# Patient Record
Sex: FEMALE | Race: WHITE | NOT HISPANIC OR LATINO | Employment: FULL TIME | URBAN - METROPOLITAN AREA
[De-identification: names, ages, dates, MRNs, and addresses within clinical notes are randomized per-mention and may not be internally consistent; named-entity substitution may affect disease eponyms.]

---

## 2024-01-04 ENCOUNTER — LAB (OUTPATIENT)
Dept: LAB | Facility: HOSPITAL | Age: 24
End: 2024-01-04
Payer: COMMERCIAL

## 2024-01-04 ENCOUNTER — TRANSCRIBE ORDERS (OUTPATIENT)
Dept: ADMINISTRATIVE | Facility: HOSPITAL | Age: 24
End: 2024-01-04
Payer: COMMERCIAL

## 2024-01-04 DIAGNOSIS — O02.1 MISSED ABORTION: Primary | ICD-10-CM

## 2024-01-04 DIAGNOSIS — O02.1 MISSED ABORTION: ICD-10-CM

## 2024-01-04 LAB — HCG INTACT+B SERPL-ACNC: NORMAL MIU/ML

## 2024-01-04 PROCEDURE — 84702 CHORIONIC GONADOTROPIN TEST: CPT

## 2024-01-04 PROCEDURE — 36415 COLL VENOUS BLD VENIPUNCTURE: CPT

## 2024-01-08 ENCOUNTER — APPOINTMENT (OUTPATIENT)
Dept: ULTRASOUND IMAGING | Facility: HOSPITAL | Age: 24
End: 2024-01-08
Payer: COMMERCIAL

## 2024-01-08 ENCOUNTER — HOSPITAL ENCOUNTER (EMERGENCY)
Facility: HOSPITAL | Age: 24
Discharge: HOME | End: 2024-01-08
Attending: EMERGENCY MEDICINE | Admitting: EMERGENCY MEDICINE
Payer: COMMERCIAL

## 2024-01-08 VITALS
TEMPERATURE: 98 F | BODY MASS INDEX: 40.4 KG/M2 | RESPIRATION RATE: 18 BRPM | WEIGHT: 266.54 LBS | HEIGHT: 68 IN | DIASTOLIC BLOOD PRESSURE: 71 MMHG | HEART RATE: 90 BPM | OXYGEN SATURATION: 98 % | SYSTOLIC BLOOD PRESSURE: 118 MMHG

## 2024-01-08 DIAGNOSIS — N93.9 VAGINAL BLEEDING: ICD-10-CM

## 2024-01-08 DIAGNOSIS — O02.1 MISSED ABORTION: Primary | ICD-10-CM

## 2024-01-08 LAB
ABO GROUP BLD: NORMAL
ANION GAP SERPL CALCULATED.3IONS-SCNC: 12 MMOL/L (ref 5–15)
BASOPHILS # BLD AUTO: 0 10*3/MM3 (ref 0–0.2)
BASOPHILS NFR BLD AUTO: 0.3 % (ref 0–1.5)
BLD GP AB SCN SERPL QL: NEGATIVE
BUN SERPL-MCNC: 9 MG/DL (ref 6–20)
BUN/CREAT SERPL: 17.6 (ref 7–25)
CALCIUM SPEC-SCNC: 9.4 MG/DL (ref 8.6–10.5)
CHLORIDE SERPL-SCNC: 104 MMOL/L (ref 98–107)
CO2 SERPL-SCNC: 24 MMOL/L (ref 22–29)
CREAT SERPL-MCNC: 0.51 MG/DL (ref 0.57–1)
DEPRECATED RDW RBC AUTO: 49 FL (ref 37–54)
EGFRCR SERPLBLD CKD-EPI 2021: 134.7 ML/MIN/1.73
EOSINOPHIL # BLD AUTO: 0.1 10*3/MM3 (ref 0–0.4)
EOSINOPHIL NFR BLD AUTO: 1.2 % (ref 0.3–6.2)
ERYTHROCYTE [DISTWIDTH] IN BLOOD BY AUTOMATED COUNT: 14.9 % (ref 12.3–15.4)
GLUCOSE SERPL-MCNC: 84 MG/DL (ref 65–99)
HCG INTACT+B SERPL-ACNC: 2963 MIU/ML
HCT VFR BLD AUTO: 39.7 % (ref 34–46.6)
HGB BLD-MCNC: 13.3 G/DL (ref 12–15.9)
HOLD SPECIMEN: NORMAL
HOLD SPECIMEN: NORMAL
LYMPHOCYTES # BLD AUTO: 2.1 10*3/MM3 (ref 0.7–3.1)
LYMPHOCYTES NFR BLD AUTO: 18.9 % (ref 19.6–45.3)
MCH RBC QN AUTO: 29.9 PG (ref 26.6–33)
MCHC RBC AUTO-ENTMCNC: 33.5 G/DL (ref 31.5–35.7)
MCV RBC AUTO: 89.1 FL (ref 79–97)
MONOCYTES # BLD AUTO: 0.6 10*3/MM3 (ref 0.1–0.9)
MONOCYTES NFR BLD AUTO: 5.2 % (ref 5–12)
NEUTROPHILS NFR BLD AUTO: 74.4 % (ref 42.7–76)
NEUTROPHILS NFR BLD AUTO: 8.2 10*3/MM3 (ref 1.7–7)
NRBC BLD AUTO-RTO: 0 /100 WBC (ref 0–0.2)
PLATELET # BLD AUTO: 285 10*3/MM3 (ref 140–450)
PMV BLD AUTO: 8.4 FL (ref 6–12)
POTASSIUM SERPL-SCNC: 4 MMOL/L (ref 3.5–5.2)
RBC # BLD AUTO: 4.45 10*6/MM3 (ref 3.77–5.28)
RH BLD: POSITIVE
SODIUM SERPL-SCNC: 140 MMOL/L (ref 136–145)
T&S EXPIRATION DATE: NORMAL
WBC NRBC COR # BLD AUTO: 11 10*3/MM3 (ref 3.4–10.8)
WHOLE BLOOD HOLD COAG: NORMAL
WHOLE BLOOD HOLD SPECIMEN: NORMAL

## 2024-01-08 PROCEDURE — 84702 CHORIONIC GONADOTROPIN TEST: CPT

## 2024-01-08 PROCEDURE — 93976 VASCULAR STUDY: CPT

## 2024-01-08 PROCEDURE — 85025 COMPLETE CBC W/AUTO DIFF WBC: CPT

## 2024-01-08 PROCEDURE — 86900 BLOOD TYPING SEROLOGIC ABO: CPT

## 2024-01-08 PROCEDURE — 86901 BLOOD TYPING SEROLOGIC RH(D): CPT

## 2024-01-08 PROCEDURE — 86900 BLOOD TYPING SEROLOGIC ABO: CPT | Performed by: EMERGENCY MEDICINE

## 2024-01-08 PROCEDURE — 86850 RBC ANTIBODY SCREEN: CPT | Performed by: EMERGENCY MEDICINE

## 2024-01-08 PROCEDURE — 80048 BASIC METABOLIC PNL TOTAL CA: CPT

## 2024-01-08 PROCEDURE — 86901 BLOOD TYPING SEROLOGIC RH(D): CPT | Performed by: EMERGENCY MEDICINE

## 2024-01-08 PROCEDURE — 25810000003 SODIUM CHLORIDE 0.9 % SOLUTION

## 2024-01-08 PROCEDURE — 76830 TRANSVAGINAL US NON-OB: CPT

## 2024-01-08 PROCEDURE — 76856 US EXAM PELVIC COMPLETE: CPT

## 2024-01-08 PROCEDURE — 99284 EMERGENCY DEPT VISIT MOD MDM: CPT

## 2024-01-08 RX ORDER — MISOPROSTOL 200 UG/1
800 TABLET ORAL ONCE
Qty: 4 TABLET | Refills: 0 | Status: SHIPPED | OUTPATIENT
Start: 2024-01-08 | End: 2024-01-08

## 2024-01-08 RX ORDER — SODIUM CHLORIDE 0.9 % (FLUSH) 0.9 %
10 SYRINGE (ML) INJECTION AS NEEDED
Status: DISCONTINUED | OUTPATIENT
Start: 2024-01-08 | End: 2024-01-08 | Stop reason: HOSPADM

## 2024-01-08 RX ORDER — ONDANSETRON 4 MG/1
4 TABLET, ORALLY DISINTEGRATING ORAL EVERY 6 HOURS PRN
Qty: 8 TABLET | Refills: 0 | Status: SHIPPED | OUTPATIENT
Start: 2024-01-08

## 2024-01-08 RX ADMIN — SODIUM CHLORIDE 1000 ML: 9 INJECTION, SOLUTION INTRAVENOUS at 12:56

## 2024-01-08 NOTE — DISCHARGE INSTRUCTIONS
Take medication as directed by Dr. Le    Follow-up as scheduled    Rest, increase fluids    Follow-up with PCP as needed    Return to the ER for new or worsening symptoms

## 2024-01-08 NOTE — ED NOTES
Pt c/o vaginal bleeding that started on 1/3/24.  Pt reports passing tissue on the 4th.  Pt took medication on 1/3/24 to pass products of conception d/t miscarriage.

## 2024-01-08 NOTE — ED PROVIDER NOTES
"Subjective   History of Present Illness  Chief Complaint: Vaginal bleeding      HPI: Patient is a 23-year-old female who presents by private vehicle with spouse at bedside reports she had a confirmed pregnancy on January 3 she began having vaginal bleeding she was approximately 11 weeks 2 days ultrasound was obtained noted baby only measuring 10 weeks 5 days she is a G2, .  She completed the \"vaginal pill, 2 move along delivery of the baby on  she states that she passed the baby and went to her OB/GYN.  She has since had an increase in bleeding with large clots approximately the size of small oranges.  She has had no dizziness, attempted treatment with over-the-counter Tylenol Extra Strength as well as Advil Tylenol combination states the cramping has improved but continues to be intermittent.  She has had no nausea or vomiting no dysuria or frequency.    PCP: Fred  GYN: Rey    History provided by:  Patient      Review of Systems   Gastrointestinal:  Positive for abdominal pain. Negative for constipation, diarrhea and nausea.   Genitourinary:  Positive for vaginal bleeding.       No past medical history on file.    No Known Allergies    No past surgical history on file.    No family history on file.    Social History     Socioeconomic History    Marital status:            Objective   Physical Exam  Vitals reviewed.   Constitutional:       Appearance: She is obese.   HENT:      Head: Normocephalic.   Eyes:      Extraocular Movements: Extraocular movements intact.      Pupils: Pupils are equal, round, and reactive to light.   Cardiovascular:      Rate and Rhythm: Normal rate.      Pulses: Normal pulses.   Pulmonary:      Effort: Pulmonary effort is normal.      Breath sounds: Normal breath sounds.   Musculoskeletal:      Cervical back: Normal range of motion.   Skin:     General: Skin is warm and dry.      Capillary Refill: Capillary refill takes less than 2 seconds.   Neurological:      " "General: No focal deficit present.      Mental Status: She is alert and oriented to person, place, and time. Mental status is at baseline.      Motor: No weakness.         Procedures  She was placed in the lithotomy position and external genitalia were found to have no lesions and no swelling.  Speculum exam shows open cervix and moderate blood in the vaginal vault, note tissue at the cervical os.  The patient had no cervical motion tenderness-the cervix.  Patient had no adnexal tenderness.  The patient had cultures obtained and the exam was performed with Yenifer marroquin at bedside         ED Course  ED Course as of 01/08/24 1730   Mon Jan 08, 2024   1242 Patient was on room air during my evaluation.  No complaints of chest pain or shortness of breath. []   1449 Returned call from OB. []   1549 Awaiting return call from OB   []   1600 Poke with Dr. Le with OB/GYN reviewing the patient's presentation and ED findings.  Patient be kept n.p.o. and plan for possible transfer to OR for D&C.  Patient has been updated. []   1602 I have spoke to the patient at bedside regarding the plan of care she did just tell me that she has eaten oreos in the last hour.  []   1605 Dr. Le to bedside to discuss plan of care.  []      ED Course User Index  [] Ruth Keys, APRN      /71   Pulse 90   Temp 98 °F (36.7 °C)   Resp 18   Ht 172.7 cm (68\")   Wt 121 kg (266 lb 8.6 oz)   LMP 10/15/2023 (Exact Date)   SpO2 98%   BMI 40.53 kg/m²   Labs Reviewed   BASIC METABOLIC PANEL - Abnormal; Notable for the following components:       Result Value    Creatinine 0.51 (*)     All other components within normal limits    Narrative:     GFR Normal >60  Chronic Kidney Disease <60  Kidney Failure <15     CBC WITH AUTO DIFFERENTIAL - Abnormal; Notable for the following components:    WBC 11.00 (*)     Lymphocyte % 18.9 (*)     Neutrophils, Absolute 8.20 (*)     All other components within normal limits "   RAINBOW DRAW    Narrative:     The following orders were created for panel order [place] Draw.  Procedure                               Abnormality         Status                     ---------                               -----------         ------                     Green Top (Gel)[[id number]]                                  Final result               Lavender Top[[id number]]                                     Final result               Gold Top - SST[[id number]]                                   Final result               Light Blue Top[[id number]]                                   Final result                 Please view results for these tests on the individual orders.   HCG, QUANTITATIVE, PREGNANCY    Narrative:     HCG Ranges by Gestational Age    Females - non-pregnant premenopausal   </= 1mIU/mL HCG  Females - postmenopausal               </= 7mIU/mL HCG    3 Weeks       5.4   -      72 mIU/mL  4 Weeks      10.2   -     708 mIU/mL  5 Weeks       217   -   8,245 mIU/mL  6 Weeks       152   -  32,177 mIU/mL  7 Weeks     4,059   - 153,767 mIU/mL  8 Weeks    31,366   - 149,094 mIU/mL  9 Weeks    59,109   - 135,901 mIU/mL  10 Weeks   44,186   - 170,409 mIU/mL  12 Weeks   27,107   - 201,615 mIU/mL  14 Weeks   24,302   -  93,646 mIU/mL  15 Weeks   12,540   -  69,747 mIU/mL  16 Weeks    8,904   -  55,332 mIU/mL  17 Weeks    8,240   -  51,793 mIU/mL  18 Weeks    9,649   -  55,271 mIU/mL     TYPE AND SCREEN   GREEN TOP   LAVENDER TOP   GOLD TOP - SST   LIGHT BLUE TOP   CBC AND DIFFERENTIAL    Narrative:     The following orders were created for panel order CBC & Differential.  Procedure                               Abnormality         Status                     ---------                               -----------         ------                     CBC Auto Differential[[id number]]        Abnormal            Final result                 Please view results for these tests on the individual orders.     Medications    sodium chloride 0.9 % flush 10 mL (has no administration in time range)   sodium chloride 0.9 % flush 10 mL (has no administration in time range)   sodium chloride 0.9 % bolus 1,000 mL (0 mL Intravenous Stopped 1/8/24 1326)     US Pelvis Complete    Result Date: 1/8/2024  Impression: 1. Heterogeneous endometrial thickening up to 20 mm. No focal endometrial lesion is identified. The findings could represent residual endometrial thickening/hyperplasia related to postpartum state. Given persistence of abnormal vaginal bleeding, retained  products of conception cannot be excluded. 2. Normal sonographic appearance of the ovaries. 3. No pelvic free fluid. Electronically Signed: Lillie Alonzo MD  1/8/2024 2:23 PM EST  Workstation ID: [id number]    US Non-ob Transvaginal    Result Date: 1/8/2024  Impression: 1. Heterogeneous endometrial thickening up to 20 mm. No focal endometrial lesion is identified. The findings could represent residual endometrial thickening/hyperplasia related to postpartum state. Given persistence of abnormal vaginal bleeding, retained  products of conception cannot be excluded. 2. Normal sonographic appearance of the ovaries. 3. No pelvic free fluid. Electronically Signed: Lillie Alonzo MD  1/8/2024 2:23 PM EST  Workstation ID: [id number]                                          Medical Decision Making  Patient presented with above complaints an IV was established and labs were obtained CBC notes a hemoglobin of 13.3 white blood cell count slightly elevated 11, BMP notes no acute renal insufficiency.  hCG quant greater than 2000 this is significantly decreased from approximately 4 days ago at 22,000.  Ultrasound was obtained notes endometrial thickening cannot exclude retained products of conception.  Spoke with Dr. Le as we discussed patient is requesting a dilatation and curettage.  Tyler had discussed getting the patient in for procedure today unfortunately OR is unable to get in with her  today, Dr. Le came to bedside for additional evaluation advises that she will see the patient outpatient in 2 days and has sent in a prescription for buccal Cytotec.  At bedside I did discuss the current plan of care with the patient.  He is agreeable to this plan of care.  She denies further questions or complaints at time of discharge.  She has been hemodynamically stable throughout her emergency room stay.    Chart review: 2022 outpatient visit with audiology related to a traumatic temporal bone fracture.      Note Disclaimer: At [facility], we believe that sharing information builds trust and better  relationships. You are receiving this note because you recently visited [facility]. It is possible you will see health information before a provider has talked with you about it. This kind of information can be easy to misunderstand. To help you fully understand what it means for your health, we urge you to discuss this note with your provider.       Part of this note may be an electronic transcription/translation of spoken language to printed text using the Dragon Dictation System.    Appropriate PPE worn during exam.    Problems Addressed:  Missed : complicated acute illness or injury  Vaginal bleeding: complicated acute illness or injury    Amount and/or Complexity of Data Reviewed  External Data Reviewed: notes.  Labs: ordered. Decision-making details documented in ED Course.  Radiology: ordered and independent interpretation performed. Decision-making details documented in ED Course.    Risk  Prescription drug management.        Final diagnoses:   Missed    Vaginal bleeding       ED Disposition  ED Disposition       ED Disposition   Discharge    Condition   Stable    Comment   --               Anne-Marie Ibarra, APRN  [address] [facility] [address] [phone]          [facility] [place] [id number] [address] [address] [phone]  Call  today           Medication List        New Prescriptions      miSOPROStol 200 MCG tablet  Commonly known as: Cytotec  Take 4 tablets by mouth 1 (One) Time for 1 dose. Place in mouth, let medication dissolve. Place two tablets on each side of the cheek to allow medication to dissolve.     ondansetron ODT 4 MG disintegrating tablet  Commonly known as: ZOFRAN-ODT  Place 1 tablet on the tongue Every 6 (Six) Hours As Needed for Nausea or Vomiting.               Where to Get Your Medications        These medications were sent to [facility] - [facility] [place] - [address] - [phone] [place] [phone]   [address] [facility] [place] [address]      Phone: [phone]   miSOPROStol 200 MCG tablet  ondansetron ODT 4 MG disintegrating tablet            Ruth Keys, APRN  01/08/24 0979

## 2024-01-08 NOTE — CONSULTS
Referring Provider: NICHOLAS Guillen   Reason for Consultation: Missed , cannot rule out rpoc    Patient Care Team:  Anne-Marie Ibarra APRN as PCP - General (Nurse Practitioner)    Chief complaint prior missed  with vaginal bleeding    Subjective .     History of present illness:  24 y/o  presents to ED with heavy vaginal bleeding. She has known missed  diagnosed on US in the office last week. She opted for medical management where she took 2 doses of cytotec, 400mcg vaginally each time. Following cytotec, she does report heavy bleeding and passage of products. She came in today because she had increase in bleeding and passage of clots. She was concerned about amount of bleeding. At this time, she reports bleeding has improved, no pain, lightheadedness or dizziness. US showed no CRL, but 2cm thickened EMS and cannot rule out RPOC. Patient recently ate cookies for a snack after US while in the ED. She did have prior D&C for first miscarriage.       * No active hospital problems. *      No past medical history on file.    No past surgical history on file.    No obstetric history on file.    No Known Allergies      Objective     Vital Signs   Vitals:    24 1529 24 1534 24 1539 24 1544   BP: 118/71      BP Location:       Patient Position:       Pulse: 82 86 90 90   Resp:       Temp:       TempSrc:       SpO2: 99% 98% 98% 98%   Weight:       Height:         Temp (24hrs), Av.2 °F (36.8 °C), Min:98.2 °F (36.8 °C), Max:98.2 °F (36.8 °C)      Physical Exam:     General Appearance:    Alert, cooperative, in no acute distress   Abdomen:     Normal bowel sounds, no masses, no organomegaly, soft        non-tender, non-distended, no guarding, no rebound                 tenderness   Genitalia:    Per APRN, had minimal bleeding, with no products noted- see note for more information      Lab Results:  Lab Results (last 48 hours)       Procedure Component Value Units  Date/Time    [place] Draw [[id number]] Collected: 01/08/24 1219    Specimen: Blood Updated: 01/08/24 1330    Narrative:      The following orders were created for panel order [place] Draw.  Procedure                               Abnormality         Status                     ---------                               -----------         ------                     Green Top (Gel)[[id number]]                                  Final result               Lavender Top[[id number]]                                     Final result               Gold Top - SST[[id number]]                                   Final result               Light Blue Top[[id number]]                                   Final result                 Please view results for these tests on the individual orders.    Green Top (Gel) [[id number]] Collected: 01/08/24 1219    Specimen: Blood from Arm, Left Updated: 01/08/24 1330     Extra Tube Hold for add-ons.     Comment: Auto resulted.       Lavender Top [[id number]] Collected: 01/08/24 1219    Specimen: Blood from Arm, Left Updated: 01/08/24 1330     Extra Tube hold for add-on     Comment: Auto resulted       Gold Top - SST [[id number]] Collected: 01/08/24 1219    Specimen: Blood from Arm, Left Updated: 01/08/24 1330     Extra Tube Hold for add-ons.     Comment: Auto resulted.       Light Blue Top [[id number]] Collected: 01/08/24 1219    Specimen: Blood Updated: 01/08/24 1330     Extra Tube Hold for add-ons.     Comment: Auto resulted       CBC & Differential [[id number]]  (Abnormal) Collected: 01/08/24 1219    Specimen: Blood from Arm, Left Updated: 01/08/24 1318    Narrative:      The following orders were created for panel order CBC & Differential.  Procedure                               Abnormality         Status                     ---------                               -----------         ------                     CBC Auto Differential[[id number]]        Abnormal            Final result                 Please  view results for these tests on the individual orders.    CBC Auto Differential [[id number]]  (Abnormal) Collected: 01/08/24 1219    Specimen: Blood from Arm, Left Updated: 01/08/24 1318     WBC 11.00 10*3/mm3      RBC 4.45 10*6/mm3      Hemoglobin 13.3 g/dL      Hematocrit 39.7 %      MCV 89.1 fL      MCH 29.9 pg      MCHC 33.5 g/dL      RDW 14.9 %      RDW-SD 49.0 fl      MPV 8.4 fL      Platelets 285 10*3/mm3      Neutrophil % 74.4 %      Lymphocyte % 18.9 %      Monocyte % 5.2 %      Eosinophil % 1.2 %      Basophil % 0.3 %      Neutrophils, Absolute 8.20 10*3/mm3      Lymphocytes, Absolute 2.10 10*3/mm3      Monocytes, Absolute 0.60 10*3/mm3      Eosinophils, Absolute 0.10 10*3/mm3      Basophils, Absolute 0.00 10*3/mm3      nRBC 0.0 /100 WBC     hCG, Quantitative, Pregnancy [[id number]] Collected: 01/08/24 1219    Specimen: Blood from Arm, Left Updated: 01/08/24 1254     HCG Quantitative 2,963.00 mIU/mL     Narrative:      HCG Ranges by Gestational Age    Females - non-pregnant premenopausal   </= 1mIU/mL HCG  Females - postmenopausal               </= 7mIU/mL HCG    3 Weeks       5.4   -      72 mIU/mL  4 Weeks      10.2   -     708 mIU/mL  5 Weeks       217   -   8,245 mIU/mL  6 Weeks       152   -  32,177 mIU/mL  7 Weeks     4,059   - 153,767 mIU/mL  8 Weeks    31,366   - 149,094 mIU/mL  9 Weeks    59,109   - 135,901 mIU/mL  10 Weeks   44,186   - 170,409 mIU/mL  12 Weeks   27,107   - 201,615 mIU/mL  14 Weeks   24,302   -  93,646 mIU/mL  15 Weeks   12,540   -  69,747 mIU/mL  16 Weeks    8,904   -  55,332 mIU/mL  17 Weeks    8,240   -  51,793 mIU/mL  18 Weeks    9,649   -  55,271 mIU/mL      Basic Metabolic Panel [[id number]]  (Abnormal) Collected: 01/08/24 1219    Specimen: Blood from Arm, Left Updated: 01/08/24 1252     Glucose 84 mg/dL      BUN 9 mg/dL      Creatinine 0.51 mg/dL      Sodium 140 mmol/L      Potassium 4.0 mmol/L      Chloride 104 mmol/L      CO2 24.0 mmol/L      Calcium 9.4 mg/dL       BUN/Creatinine Ratio 17.6     Anion Gap 12.0 mmol/L      eGFR 134.7 mL/min/1.73     Narrative:      GFR Normal >60  Chronic Kidney Disease <60  Kidney Failure <15              Radiology Results:  Imaging Results (Last 72 Hours)       Procedure Component Value Units Date/Time    US Pelvis Complete [[id number]] Collected: 24 1419     Updated: 24 1425    Narrative:      US PELVIS COMPLETE, US NON-OB TRANSVAGINAL    Date of Exam: 2024 2:09 PM EST    Indication: Spontaneous  2024. Increased vaginal bleeding..    Comparison: No comparisons available.    Technique: Transabdominal and transvaginal ultrasound evaluation of the pelvis was performed utilizing grayscale and color Doppler technique.  Doppler spectral analysis was performed.      Findings:  The uterus measures 11.2 x 6.4 x 6.2 cm. The myometrium is homogeneous without focal abnormality. Heterogeneous endometrial canal thickening up to 2 cm. No focal no well-defined endometrial lesion is identified. No endometrial soft tissue   hypervascularity is evident. Trace fluid is suggested within the endometrial canal (image 49).    No gross pelvic free fluid is evident.    The right ovary measures 2.9 x 3.6 x 1.9 cm. Left ovary measures 3.2 x 3.4 x 2.1 submeters. The ovaries demonstrate no cystic or solid abnormality. The ovaries demonstrate normal color flow. Ovaries are seen on transabdominal imaging only.      Impression:      Impression:    1. Heterogeneous endometrial thickening up to 20 mm. No focal endometrial lesion is identified. The findings could represent residual endometrial thickening/hyperplasia related to postpartum state. Given persistence of abnormal vaginal bleeding, retained   products of conception cannot be excluded.  2. Normal sonographic appearance of the ovaries.  3. No pelvic free fluid.    Electronically Signed: Lillie Alonzo MD    2024 2:23 PM EST    Workstation ID: [id number]    US Non-ob Transvaginal [[id number]]  Collected: 24 1419     Updated: 24 1425    Narrative:      US PELVIS COMPLETE, US NON-OB TRANSVAGINAL    Date of Exam: 2024 2:09 PM EST    Indication: Spontaneous  2024. Increased vaginal bleeding..    Comparison: No comparisons available.    Technique: Transabdominal and transvaginal ultrasound evaluation of the pelvis was performed utilizing grayscale and color Doppler technique.  Doppler spectral analysis was performed.      Findings:  The uterus measures 11.2 x 6.4 x 6.2 cm. The myometrium is homogeneous without focal abnormality. Heterogeneous endometrial canal thickening up to 2 cm. No focal no well-defined endometrial lesion is identified. No endometrial soft tissue   hypervascularity is evident. Trace fluid is suggested within the endometrial canal (image 49).    No gross pelvic free fluid is evident.    The right ovary measures 2.9 x 3.6 x 1.9 cm. Left ovary measures 3.2 x 3.4 x 2.1 submeters. The ovaries demonstrate no cystic or solid abnormality. The ovaries demonstrate normal color flow. Ovaries are seen on transabdominal imaging only.      Impression:      Impression:    1. Heterogeneous endometrial thickening up to 20 mm. No focal endometrial lesion is identified. The findings could represent residual endometrial thickening/hyperplasia related to postpartum state. Given persistence of abnormal vaginal bleeding, retained   products of conception cannot be excluded.  2. Normal sonographic appearance of the ovaries.  3. No pelvic free fluid.    Electronically Signed: Lillie Alonzo MD    2024 2:23 PM EST    Workstation ID: [id number]            Assessment & Plan       * No active hospital problems. *      22 y/o  with prior missed  with medical management, presents to ED due to heavy vaginal bleeding.     Patient hemodynamically stable, normal VSS.   Hb 13 and platelets 285k  Patient counseled on suction D&C to remove possible clot or POC vs repeat dose of  800mcg of cytotec, since she only took the complete dose one time.   Patient counseled on risks of surgery: bleeding, infection, uterine perforation, and risk of uterine scarring.   Patient counseled on risks of medical management and may end up needing surgery in future.   She just ate oreos so surgery not emergent, will not perform in 8 hours due to recent eating.   Patient reports she opts for repeat dose of 800mcg of cytotec, will take it buccally.   Patient counseled on the TIERA of cytotec therapy, will send zofran as needed for nausea and vomiting.   She has a FU apt with me in the office on 1/11/24 and will assess if additional management indicated.   Patient given strict bleeding and return precautions.   Offered patient to have medication placed vaginally while in ED prior to discharge, patient declined and wants to take medication buccally at home.   Rh positive, rhogam not indicated.     I discussed the patients findings and my recommendations with patient     Jessenia Le MD   1/8/2024   16:03 EST

## 2024-01-11 ENCOUNTER — HOSPITAL ENCOUNTER (OUTPATIENT)
Facility: HOSPITAL | Age: 24
Discharge: HOME | End: 2024-01-11
Attending: STUDENT IN AN ORGANIZED HEALTH CARE EDUCATION/TRAINING PROGRAM | Admitting: STUDENT IN AN ORGANIZED HEALTH CARE EDUCATION/TRAINING PROGRAM
Payer: COMMERCIAL

## 2024-01-11 ENCOUNTER — ANESTHESIA EVENT (OUTPATIENT)
Dept: PERIOP | Facility: HOSPITAL | Age: 24
End: 2024-01-11
Payer: COMMERCIAL

## 2024-01-11 ENCOUNTER — ANESTHESIA (OUTPATIENT)
Dept: PERIOP | Facility: HOSPITAL | Age: 24
End: 2024-01-11
Payer: COMMERCIAL

## 2024-01-11 ENCOUNTER — LAB (OUTPATIENT)
Dept: LAB | Facility: HOSPITAL | Age: 24
End: 2024-01-11
Payer: COMMERCIAL

## 2024-01-11 ENCOUNTER — TRANSCRIBE ORDERS (OUTPATIENT)
Dept: LAB | Facility: HOSPITAL | Age: 24
End: 2024-01-11
Payer: COMMERCIAL

## 2024-01-11 VITALS
TEMPERATURE: 97.8 F | SYSTOLIC BLOOD PRESSURE: 123 MMHG | DIASTOLIC BLOOD PRESSURE: 73 MMHG | HEART RATE: 79 BPM | OXYGEN SATURATION: 96 % | RESPIRATION RATE: 17 BRPM

## 2024-01-11 DIAGNOSIS — Z01.818 PRE-OP TESTING: ICD-10-CM

## 2024-01-11 DIAGNOSIS — Z01.818 PRE-OP TESTING: Primary | ICD-10-CM

## 2024-01-11 PROBLEM — N96 RECURRENT PREGNANCY LOSS: Status: ACTIVE | Noted: 2024-01-11

## 2024-01-11 PROBLEM — O02.1 MISSED ABORTION: Status: ACTIVE | Noted: 2024-01-11

## 2024-01-11 LAB
ABO GROUP BLD: NORMAL
BASOPHILS # BLD AUTO: 0.1 10*3/MM3 (ref 0–0.2)
BASOPHILS NFR BLD AUTO: 0.7 % (ref 0–1.5)
BLD GP AB SCN SERPL QL: NEGATIVE
DEPRECATED RDW RBC AUTO: 48.6 FL (ref 37–54)
EOSINOPHIL # BLD AUTO: 0.1 10*3/MM3 (ref 0–0.4)
EOSINOPHIL NFR BLD AUTO: 1.4 % (ref 0.3–6.2)
ERYTHROCYTE [DISTWIDTH] IN BLOOD BY AUTOMATED COUNT: 14.9 % (ref 12.3–15.4)
HCT VFR BLD AUTO: 37.1 % (ref 34–46.6)
HGB BLD-MCNC: 12.4 G/DL (ref 12–15.9)
LYMPHOCYTES # BLD AUTO: 2.2 10*3/MM3 (ref 0.7–3.1)
LYMPHOCYTES NFR BLD AUTO: 25.2 % (ref 19.6–45.3)
MCH RBC QN AUTO: 29.7 PG (ref 26.6–33)
MCHC RBC AUTO-ENTMCNC: 33.4 G/DL (ref 31.5–35.7)
MCV RBC AUTO: 88.9 FL (ref 79–97)
MONOCYTES # BLD AUTO: 0.4 10*3/MM3 (ref 0.1–0.9)
MONOCYTES NFR BLD AUTO: 4.5 % (ref 5–12)
NEUTROPHILS NFR BLD AUTO: 5.9 10*3/MM3 (ref 1.7–7)
NEUTROPHILS NFR BLD AUTO: 68.2 % (ref 42.7–76)
NRBC BLD AUTO-RTO: 0 /100 WBC (ref 0–0.2)
PLATELET # BLD AUTO: 277 10*3/MM3 (ref 140–450)
PMV BLD AUTO: 7.7 FL (ref 6–12)
RBC # BLD AUTO: 4.18 10*6/MM3 (ref 3.77–5.28)
RH BLD: POSITIVE
T&S EXPIRATION DATE: NORMAL
WBC NRBC COR # BLD AUTO: 8.7 10*3/MM3 (ref 3.4–10.8)

## 2024-01-11 PROCEDURE — 25810000003 LACTATED RINGERS PER 1000 ML: Performed by: STUDENT IN AN ORGANIZED HEALTH CARE EDUCATION/TRAINING PROGRAM

## 2024-01-11 PROCEDURE — 86901 BLOOD TYPING SEROLOGIC RH(D): CPT

## 2024-01-11 PROCEDURE — 25010000002 MIDAZOLAM PER 1 MG: Performed by: NURSE ANESTHETIST, CERTIFIED REGISTERED

## 2024-01-11 PROCEDURE — 86900 BLOOD TYPING SEROLOGIC ABO: CPT

## 2024-01-11 PROCEDURE — 86850 RBC ANTIBODY SCREEN: CPT

## 2024-01-11 PROCEDURE — 88305 TISSUE EXAM BY PATHOLOGIST: CPT | Performed by: STUDENT IN AN ORGANIZED HEALTH CARE EDUCATION/TRAINING PROGRAM

## 2024-01-11 PROCEDURE — 85025 COMPLETE CBC W/AUTO DIFF WBC: CPT

## 2024-01-11 PROCEDURE — 25010000002 ONDANSETRON PER 1 MG: Performed by: NURSE ANESTHETIST, CERTIFIED REGISTERED

## 2024-01-11 PROCEDURE — 36415 COLL VENOUS BLD VENIPUNCTURE: CPT

## 2024-01-11 PROCEDURE — 25010000002 PROPOFOL 200 MG/20ML EMULSION: Performed by: NURSE ANESTHETIST, CERTIFIED REGISTERED

## 2024-01-11 PROCEDURE — 25010000002 DEXAMETHASONE PER 1 MG: Performed by: NURSE ANESTHETIST, CERTIFIED REGISTERED

## 2024-01-11 PROCEDURE — 25010000002 FENTANYL CITRATE (PF) 100 MCG/2ML SOLUTION: Performed by: NURSE ANESTHETIST, CERTIFIED REGISTERED

## 2024-01-11 RX ORDER — NALOXONE HCL 0.4 MG/ML
0.4 VIAL (ML) INJECTION AS NEEDED
Status: DISCONTINUED | OUTPATIENT
Start: 2024-01-11 | End: 2024-01-11 | Stop reason: HOSPADM

## 2024-01-11 RX ORDER — FENTANYL CITRATE 50 UG/ML
INJECTION, SOLUTION INTRAMUSCULAR; INTRAVENOUS AS NEEDED
Status: DISCONTINUED | OUTPATIENT
Start: 2024-01-11 | End: 2024-01-11 | Stop reason: SURG

## 2024-01-11 RX ORDER — MISOPROSTOL 100 UG/1
TABLET ORAL AS NEEDED
Status: DISCONTINUED | OUTPATIENT
Start: 2024-01-11 | End: 2024-01-11 | Stop reason: HOSPADM

## 2024-01-11 RX ORDER — FENTANYL CITRATE 50 UG/ML
25 INJECTION, SOLUTION INTRAMUSCULAR; INTRAVENOUS
Status: DISCONTINUED | OUTPATIENT
Start: 2024-01-11 | End: 2024-01-11 | Stop reason: HOSPADM

## 2024-01-11 RX ORDER — LIDOCAINE HYDROCHLORIDE 10 MG/ML
0.5 INJECTION, SOLUTION INFILTRATION; PERINEURAL ONCE AS NEEDED
Status: DISCONTINUED | OUTPATIENT
Start: 2024-01-11 | End: 2024-01-11 | Stop reason: HOSPADM

## 2024-01-11 RX ORDER — SENNOSIDES 8.6 MG
650 CAPSULE ORAL EVERY 8 HOURS PRN
Qty: 30 TABLET | Refills: 0 | Status: SHIPPED | OUTPATIENT
Start: 2024-01-11

## 2024-01-11 RX ORDER — ALBUTEROL SULFATE 2.5 MG/3ML
2.5 SOLUTION RESPIRATORY (INHALATION) ONCE AS NEEDED
Status: DISCONTINUED | OUTPATIENT
Start: 2024-01-11 | End: 2024-01-11 | Stop reason: HOSPADM

## 2024-01-11 RX ORDER — SODIUM CHLORIDE 0.9 % (FLUSH) 0.9 %
3 SYRINGE (ML) INJECTION EVERY 12 HOURS SCHEDULED
Status: DISCONTINUED | OUTPATIENT
Start: 2024-01-11 | End: 2024-01-11 | Stop reason: HOSPADM

## 2024-01-11 RX ORDER — ACETAMINOPHEN 325 MG/1
650 TABLET ORAL ONCE AS NEEDED
Status: DISCONTINUED | OUTPATIENT
Start: 2024-01-11 | End: 2024-01-11 | Stop reason: HOSPADM

## 2024-01-11 RX ORDER — ACETAMINOPHEN 650 MG/1
325 SUPPOSITORY RECTAL EVERY 4 HOURS PRN
Status: DISCONTINUED | OUTPATIENT
Start: 2024-01-11 | End: 2024-01-11 | Stop reason: HOSPADM

## 2024-01-11 RX ORDER — HYDRALAZINE HYDROCHLORIDE 20 MG/ML
5 INJECTION INTRAMUSCULAR; INTRAVENOUS
Status: DISCONTINUED | OUTPATIENT
Start: 2024-01-11 | End: 2024-01-11 | Stop reason: HOSPADM

## 2024-01-11 RX ORDER — SODIUM CHLORIDE 0.9 % (FLUSH) 0.9 %
3-10 SYRINGE (ML) INJECTION AS NEEDED
Status: DISCONTINUED | OUTPATIENT
Start: 2024-01-11 | End: 2024-01-11 | Stop reason: HOSPADM

## 2024-01-11 RX ORDER — IBUPROFEN 800 MG/1
800 TABLET ORAL EVERY 8 HOURS PRN
Qty: 30 TABLET | Refills: 0 | Status: SHIPPED | OUTPATIENT
Start: 2024-01-11

## 2024-01-11 RX ORDER — MIDAZOLAM HYDROCHLORIDE 1 MG/ML
INJECTION INTRAMUSCULAR; INTRAVENOUS AS NEEDED
Status: DISCONTINUED | OUTPATIENT
Start: 2024-01-11 | End: 2024-01-11 | Stop reason: SURG

## 2024-01-11 RX ORDER — PROPOFOL 10 MG/ML
INJECTION, EMULSION INTRAVENOUS AS NEEDED
Status: DISCONTINUED | OUTPATIENT
Start: 2024-01-11 | End: 2024-01-11 | Stop reason: SURG

## 2024-01-11 RX ORDER — PROCHLORPERAZINE EDISYLATE 5 MG/ML
10 INJECTION INTRAMUSCULAR; INTRAVENOUS ONCE AS NEEDED
Status: DISCONTINUED | OUTPATIENT
Start: 2024-01-11 | End: 2024-01-11 | Stop reason: HOSPADM

## 2024-01-11 RX ORDER — SODIUM CHLORIDE 9 MG/ML
40 INJECTION, SOLUTION INTRAVENOUS AS NEEDED
Status: DISCONTINUED | OUTPATIENT
Start: 2024-01-11 | End: 2024-01-11 | Stop reason: HOSPADM

## 2024-01-11 RX ORDER — LABETALOL HYDROCHLORIDE 5 MG/ML
5 INJECTION, SOLUTION INTRAVENOUS
Status: DISCONTINUED | OUTPATIENT
Start: 2024-01-11 | End: 2024-01-11 | Stop reason: HOSPADM

## 2024-01-11 RX ORDER — SODIUM CHLORIDE 0.9 % (FLUSH) 0.9 %
10 SYRINGE (ML) INJECTION AS NEEDED
Status: DISCONTINUED | OUTPATIENT
Start: 2024-01-11 | End: 2024-01-11 | Stop reason: HOSPADM

## 2024-01-11 RX ORDER — FENTANYL CITRATE 50 UG/ML
50 INJECTION, SOLUTION INTRAMUSCULAR; INTRAVENOUS
Status: DISCONTINUED | OUTPATIENT
Start: 2024-01-11 | End: 2024-01-11 | Stop reason: HOSPADM

## 2024-01-11 RX ORDER — SODIUM CHLORIDE, SODIUM LACTATE, POTASSIUM CHLORIDE, CALCIUM CHLORIDE 600; 310; 30; 20 MG/100ML; MG/100ML; MG/100ML; MG/100ML
1000 INJECTION, SOLUTION INTRAVENOUS CONTINUOUS
Status: DISCONTINUED | OUTPATIENT
Start: 2024-01-11 | End: 2024-01-11 | Stop reason: HOSPADM

## 2024-01-11 RX ORDER — DEXAMETHASONE SODIUM PHOSPHATE 4 MG/ML
INJECTION, SOLUTION INTRA-ARTICULAR; INTRALESIONAL; INTRAMUSCULAR; INTRAVENOUS; SOFT TISSUE AS NEEDED
Status: DISCONTINUED | OUTPATIENT
Start: 2024-01-11 | End: 2024-01-11 | Stop reason: SURG

## 2024-01-11 RX ORDER — ONDANSETRON 2 MG/ML
4 INJECTION INTRAMUSCULAR; INTRAVENOUS ONCE AS NEEDED
Status: DISCONTINUED | OUTPATIENT
Start: 2024-01-11 | End: 2024-01-11 | Stop reason: HOSPADM

## 2024-01-11 RX ORDER — HYDROCODONE BITARTRATE AND ACETAMINOPHEN 7.5; 325 MG/1; MG/1
1 TABLET ORAL EVERY 4 HOURS PRN
Status: DISCONTINUED | OUTPATIENT
Start: 2024-01-11 | End: 2024-01-11 | Stop reason: HOSPADM

## 2024-01-11 RX ORDER — FLUMAZENIL 0.1 MG/ML
0.1 INJECTION INTRAVENOUS AS NEEDED
Status: DISCONTINUED | OUTPATIENT
Start: 2024-01-11 | End: 2024-01-11 | Stop reason: HOSPADM

## 2024-01-11 RX ORDER — ONDANSETRON 2 MG/ML
INJECTION INTRAMUSCULAR; INTRAVENOUS AS NEEDED
Status: DISCONTINUED | OUTPATIENT
Start: 2024-01-11 | End: 2024-01-11 | Stop reason: SURG

## 2024-01-11 RX ORDER — DIPHENHYDRAMINE HYDROCHLORIDE 50 MG/ML
12.5 INJECTION INTRAMUSCULAR; INTRAVENOUS
Status: DISCONTINUED | OUTPATIENT
Start: 2024-01-11 | End: 2024-01-11 | Stop reason: HOSPADM

## 2024-01-11 RX ADMIN — PROPOFOL 230 MG: 10 INJECTION, EMULSION INTRAVENOUS at 17:12

## 2024-01-11 RX ADMIN — FENTANYL CITRATE 50 MCG: 50 INJECTION, SOLUTION INTRAMUSCULAR; INTRAVENOUS at 17:48

## 2024-01-11 RX ADMIN — MIDAZOLAM 2 MG: 1 INJECTION INTRAMUSCULAR; INTRAVENOUS at 17:05

## 2024-01-11 RX ADMIN — FENTANYL CITRATE 50 MCG: 50 INJECTION, SOLUTION INTRAMUSCULAR; INTRAVENOUS at 17:26

## 2024-01-11 RX ADMIN — HYDROCODONE BITARTRATE AND ACETAMINOPHEN 1 TABLET: 7.5; 325 TABLET ORAL at 18:53

## 2024-01-11 RX ADMIN — FENTANYL CITRATE 50 MCG: 50 INJECTION, SOLUTION INTRAMUSCULAR; INTRAVENOUS at 17:35

## 2024-01-11 RX ADMIN — PROPOFOL 175 MCG/KG/MIN: 10 INJECTION, EMULSION INTRAVENOUS at 17:13

## 2024-01-11 RX ADMIN — DEXAMETHASONE SODIUM PHOSPHATE 4 MG: 4 INJECTION, SOLUTION INTRAMUSCULAR; INTRAVENOUS at 17:26

## 2024-01-11 RX ADMIN — ONDANSETRON 4 MG: 2 INJECTION INTRAMUSCULAR; INTRAVENOUS at 17:26

## 2024-01-11 RX ADMIN — SODIUM CHLORIDE, POTASSIUM CHLORIDE, SODIUM LACTATE AND CALCIUM CHLORIDE 1000 ML: 600; 310; 30; 20 INJECTION, SOLUTION INTRAVENOUS at 16:05

## 2024-01-11 RX ADMIN — FENTANYL CITRATE 50 MCG: 50 INJECTION, SOLUTION INTRAMUSCULAR; INTRAVENOUS at 17:08

## 2024-01-11 NOTE — ANESTHESIA POSTPROCEDURE EVALUATION
Patient: Lynn Soto    Procedure Summary       Date: 01/11/24 Room / Location: [facility] OR 08 / [facility] MAIN OR    Anesthesia Start: 1705 Anesthesia Stop: 1750    Procedure: DILATATION AND CURETTAGE WITH SUCTION (Vagina) Diagnosis:       Retained products of conception, following delivery with hemorrhage      (Retained products of conception, following delivery with hemorrhage [O72.2])    Surgeons: Jessenia Le MD Provider: Chuck Bragg MD    Anesthesia Type: general ASA Status: 2            Anesthesia Type: general    Vitals  Vitals Value Taken Time   /73 01/11/24 1847   Temp 98 °F (36.7 °C) 01/11/24 1750   Pulse 72 01/11/24 1848   Resp 15 01/11/24 1835   SpO2 97 % 01/11/24 1848   Vitals shown include unfiled device data.        Post Anesthesia Care and Evaluation    Patient location during evaluation: PACU  Patient participation: complete - patient participated  Level of consciousness: awake  Pain scale: See nurse's notes for pain score.  Pain management: adequate    Airway patency: patent  Anesthetic complications: No anesthetic complications  PONV Status: none  Cardiovascular status: acceptable  Respiratory status: acceptable and spontaneous ventilation  Hydration status: acceptable    Comments: Patient seen and examined postoperatively; vital signs stable; SpO2 greater than or equal to 90%; cardiopulmonary status stable; nausea/vomiting adequately controlled; pain adequately controlled; no apparent anesthesia complications; patient discharged from anesthesia care when discharge criteria were met

## 2024-01-11 NOTE — H&P
Patient Care Team:  Anne-Marie Ibarra APRN as PCP - General (Nurse Practitioner)    Chief complaint retained products of conception following miscarriage    Subjective     Patient is a 23 y.o. female presents with vaginal bleeding after known miscarriage, with medical management. Last seen in ED on Monday and had retained POC, opted for repeat medical management and received another dose of cytotec. Patient was evaluated in the office today and still having vaginal bleeding with passage of clots. US shows thickened EMS and cannot rule out retained products.     Review of Systems   Pertinent items are noted in HPI    History  History reviewed. No pertinent past medical history.  Past Surgical History:   Procedure Laterality Date    MOUTH SURGERY      TONSILLECTOMY       History reviewed. No pertinent family history.  Social History     Vaping Use    Vaping Use: Never used   Substance Use Topics    Alcohol use: Never    Drug use: Never       Allergies  No Known Allergies    Medications  Medications Prior to Admission   Medication Sig Dispense Refill Last Dose    ondansetron ODT (ZOFRAN-ODT) 4 MG disintegrating tablet Place 1 tablet on the tongue Every 6 (Six) Hours As Needed for Nausea or Vomiting. 8 tablet 0        Objective     Vital Signs  Vitals:    01/11/24 1604   BP: 111/71   Pulse: 82   Resp: 9   Temp: 98.1 °F (36.7 °C)   SpO2: 98%       Physical Exam:      General Appearance:    Alert, cooperative, in no acute distress   Lungs:     Clear to auscultation,respirations regular.    Heart:    Regular rhythm and normal rate.   Breast Exam:    Deferred   Abdomen:     Normal bowel sounds, no masses, soft non-tender, non-distended, no guarding, no rebound tenderness   Genitalia:    Deferred   Extremities:   Moves all extremities well, no edema, no cyanosis, no              redness   Pulses:   Pulses palpable and equal bilaterally       Results Review:      Lab Results (last 48 hours)       ** No results found for the  last 48 hours. **             Imaging Results (Last 48 Hours)       ** No results found for the last 48 hours. **            Assessment & Plan       * No active hospital problems. *      24 y/o  with prior missed  with  medical management, presents for suction D&C due to retained products of conception with consistent bleeding.       Hb 12 and platelets 277k  Patient counseled on suction D&C to remove retained POC d/t prior repeat medication dose on Monday and still having bleeding with thickened EMS  Patient counseled on risks of surgery: bleeding, infection, anesthesia risks, uterine perforation, and risk of uterine scarring following surgery  Rh positive, rhogam not indicated.   Plan for outpatient management.  I discussed the patients findings and my recommendations with patient         Jessenia Le MD  24  16:41 EST

## 2024-01-11 NOTE — OP NOTE
Subjective     Date of Service:  24    Pre-operative diagnosis(es): Retained products of conception s/p missed       Post-operative diagnosis(es): Same as above   Procedure(s): Suction D&C         Surgeon:    Dr. Jessenia Le   EBL: 5cc  Fluids 800cc          Anesthesia:  General Anesthesia       Objective      Operative findings: Normal appearing external genitalia  Moderate blood in vaginal vault, approx 0.5 cm dilated cervix noted  Moderate amount of tissue removed with procedure      Description of Procedure:   The risks, benefits, and alternatives of the procedure were discussed with the patient. She understood the risks of the procedure include but are not limited to uterine perforation, fluid overload, and rare risk of death. She wished to proceed and signed the consent.    The patient was taken to the OR where general anesthesia was administered. She was placed in the dorsal lithotomy position with Dirk stirrups. The patient was then prepared and draped in the normal sterile fashion.    A weighted speculum was inserted in the vagina. A single-tooth tenaculum was used to grasp the anterior lip of the cervix.      The cervical os was sequentially dilated to accommodate the 8 Fr suction curette using Yakut dilators.     The products of conception were removed with a suction curette and then was curetted in a clockwise fashion with the banjo curette until the products of conception were removed from all aspects of uterus. The products of conception were sent to Pathology. The tenaculum was removed from the cervix and good hemostasis was noted. The patient was given 800mcg of cytotec rectally following the procedure.     The patient tolerated the procedure well. The instrument and sponge counts were correct times two. The patient was awakened from general anesthesia and taken to the recovery room in a stable condition.           Specimens removed:   Products of conception     Complications:    none     Condition:   stable     Disposition:   to PACU and plan to discharge home               Jessenia Le MD  01/11/24  17:43 EST

## 2024-01-11 NOTE — ANESTHESIA PROCEDURE NOTES
Airway  Urgency: elective    Date/Time: 1/11/2024 5:12 PM  Airway not difficult    General Information and Staff    Patient location during procedure: OR  CRNA/CAA: Manohar Dennis CRNA    Indications and Patient Condition  Indications for airway management: airway protection    Preoxygenated: yes  Mask difficulty assessment: 1 - vent by mask    Final Airway Details  Final airway type: supraglottic airway      Successful airway: LMA and I-gel  Size 4     Number of attempts at approach: 1  Assessment: lips, teeth, and gum same as pre-op and atraumatic intubation

## 2024-01-11 NOTE — ANESTHESIA PREPROCEDURE EVALUATION
Anesthesia Evaluation     Patient summary reviewed and Nursing notes reviewed   NPO Solid Status: > 8 hours  NPO Liquid Status: > 8 hours           Airway   Mallampati: II  TM distance: >3 FB  Neck ROM: full  No difficulty expected  Dental - normal exam     Pulmonary - negative pulmonary ROS and normal exam   Cardiovascular - negative cardio ROS and normal exam        Neuro/Psych- negative ROS  GI/Hepatic/Renal/Endo    (+) obesity    Musculoskeletal (-) negative ROS    Abdominal  - normal exam    Bowel sounds: normal.   Substance History - negative use     OB/GYN negative ob/gyn ROS         Other                    Anesthesia Plan    ASA 2     general     intravenous induction     Anesthetic plan, risks, benefits, and alternatives have been provided, discussed and informed consent has been obtained with: patient.  Pre-procedure education provided  Plan discussed with CRNA.    CODE STATUS:

## 2024-01-15 LAB
LAB AP CASE REPORT: NORMAL
PATH REPORT.FINAL DX SPEC: NORMAL
PATH REPORT.GROSS SPEC: NORMAL

## 2024-04-10 ENCOUNTER — APPOINTMENT (OUTPATIENT)
Dept: ULTRASOUND IMAGING | Facility: HOSPITAL | Age: 24
End: 2024-04-10
Payer: MEDICAID

## 2024-04-10 ENCOUNTER — HOSPITAL ENCOUNTER (EMERGENCY)
Facility: HOSPITAL | Age: 24
Discharge: HOME | End: 2024-04-10
Attending: EMERGENCY MEDICINE | Admitting: EMERGENCY MEDICINE
Payer: MEDICAID

## 2024-04-10 VITALS
BODY MASS INDEX: 41.03 KG/M2 | RESPIRATION RATE: 16 BRPM | SYSTOLIC BLOOD PRESSURE: 115 MMHG | HEIGHT: 68 IN | HEART RATE: 78 BPM | WEIGHT: 270.73 LBS | DIASTOLIC BLOOD PRESSURE: 75 MMHG | TEMPERATURE: 98.4 F | OXYGEN SATURATION: 99 %

## 2024-04-10 DIAGNOSIS — O20.0 THREATENED MISCARRIAGE IN EARLY PREGNANCY: Primary | ICD-10-CM

## 2024-04-10 LAB
ABO GROUP BLD: NORMAL
ALBUMIN SERPL-MCNC: 4 G/DL (ref 3.5–5.2)
ALBUMIN/GLOB SERPL: 1.3 G/DL
ALP SERPL-CCNC: 98 U/L (ref 39–117)
ALT SERPL W P-5'-P-CCNC: 13 U/L (ref 1–33)
ANION GAP SERPL CALCULATED.3IONS-SCNC: 12 MMOL/L (ref 5–15)
AST SERPL-CCNC: 12 U/L (ref 1–32)
BASOPHILS # BLD AUTO: 0.03 10*3/MM3 (ref 0–0.2)
BASOPHILS NFR BLD AUTO: 0.3 % (ref 0–1.5)
BILIRUB SERPL-MCNC: 0.2 MG/DL (ref 0–1.2)
BUN SERPL-MCNC: 15 MG/DL (ref 6–20)
BUN/CREAT SERPL: 28.8 (ref 7–25)
CALCIUM SPEC-SCNC: 9.5 MG/DL (ref 8.6–10.5)
CHLORIDE SERPL-SCNC: 104 MMOL/L (ref 98–107)
CO2 SERPL-SCNC: 24 MMOL/L (ref 22–29)
CREAT SERPL-MCNC: 0.52 MG/DL (ref 0.57–1)
DEPRECATED RDW RBC AUTO: 43.8 FL (ref 37–54)
EGFRCR SERPLBLD CKD-EPI 2021: 134.1 ML/MIN/1.73
EOSINOPHIL # BLD AUTO: 0.15 10*3/MM3 (ref 0–0.4)
EOSINOPHIL NFR BLD AUTO: 1.3 % (ref 0.3–6.2)
ERYTHROCYTE [DISTWIDTH] IN BLOOD BY AUTOMATED COUNT: 13.4 % (ref 12.3–15.4)
GLOBULIN UR ELPH-MCNC: 3.1 GM/DL
GLUCOSE SERPL-MCNC: 110 MG/DL (ref 65–99)
HCG INTACT+B SERPL-ACNC: 2226 MIU/ML
HCT VFR BLD AUTO: 40.6 % (ref 34–46.6)
HGB BLD-MCNC: 13.3 G/DL (ref 12–15.9)
IMM GRANULOCYTES # BLD AUTO: 0.08 10*3/MM3 (ref 0–0.05)
IMM GRANULOCYTES NFR BLD AUTO: 0.7 % (ref 0–0.5)
LYMPHOCYTES # BLD AUTO: 2.23 10*3/MM3 (ref 0.7–3.1)
LYMPHOCYTES NFR BLD AUTO: 18.6 % (ref 19.6–45.3)
MCH RBC QN AUTO: 29.3 PG (ref 26.6–33)
MCHC RBC AUTO-ENTMCNC: 32.8 G/DL (ref 31.5–35.7)
MCV RBC AUTO: 89.4 FL (ref 79–97)
MONOCYTES # BLD AUTO: 0.75 10*3/MM3 (ref 0.1–0.9)
MONOCYTES NFR BLD AUTO: 6.3 % (ref 5–12)
NEUTROPHILS NFR BLD AUTO: 72.8 % (ref 42.7–76)
NEUTROPHILS NFR BLD AUTO: 8.75 10*3/MM3 (ref 1.7–7)
NRBC BLD AUTO-RTO: 0 /100 WBC (ref 0–0.2)
NUMBER OF DOSES: NORMAL
PLATELET # BLD AUTO: 269 10*3/MM3 (ref 140–450)
PMV BLD AUTO: 9.4 FL (ref 6–12)
POTASSIUM SERPL-SCNC: 4 MMOL/L (ref 3.5–5.2)
PROT SERPL-MCNC: 7.1 G/DL (ref 6–8.5)
RBC # BLD AUTO: 4.54 10*6/MM3 (ref 3.77–5.28)
RH BLD: POSITIVE
SODIUM SERPL-SCNC: 140 MMOL/L (ref 136–145)
WBC NRBC COR # BLD AUTO: 11.99 10*3/MM3 (ref 3.4–10.8)

## 2024-04-10 PROCEDURE — 85025 COMPLETE CBC W/AUTO DIFF WBC: CPT | Performed by: EMERGENCY MEDICINE

## 2024-04-10 PROCEDURE — 93976 VASCULAR STUDY: CPT

## 2024-04-10 PROCEDURE — 84702 CHORIONIC GONADOTROPIN TEST: CPT | Performed by: EMERGENCY MEDICINE

## 2024-04-10 PROCEDURE — 76801 OB US < 14 WKS SINGLE FETUS: CPT

## 2024-04-10 PROCEDURE — 86901 BLOOD TYPING SEROLOGIC RH(D): CPT | Performed by: EMERGENCY MEDICINE

## 2024-04-10 PROCEDURE — 80053 COMPREHEN METABOLIC PANEL: CPT | Performed by: EMERGENCY MEDICINE

## 2024-04-10 PROCEDURE — 86900 BLOOD TYPING SEROLOGIC ABO: CPT | Performed by: EMERGENCY MEDICINE

## 2024-04-10 PROCEDURE — 99284 EMERGENCY DEPT VISIT MOD MDM: CPT

## 2024-04-10 PROCEDURE — 76817 TRANSVAGINAL US OBSTETRIC: CPT

## 2024-04-10 RX ORDER — SODIUM CHLORIDE 0.9 % (FLUSH) 0.9 %
10 SYRINGE (ML) INJECTION AS NEEDED
Status: DISCONTINUED | OUTPATIENT
Start: 2024-04-10 | End: 2024-04-10 | Stop reason: HOSPADM

## 2024-04-10 NOTE — ED PROVIDER NOTES
"Subjective   History of Present Illness  23-year-old female presents for vaginal bleeding.  Approximately 5 weeks pregnant.  G3, P0.  Following with Dr. Le.  Getting serial hCGs to make sure that they are going up appropriately.  They have been thus far.  Been having some right pelvic pain.  States she was passing some clots but bleeding has lightened up quite a bit.  Review of Systems  See HPI.  No past medical history on file.    No Known Allergies    Past Surgical History:   Procedure Laterality Date    D & C WITH SUCTION N/A 1/11/2024    Procedure: DILATATION AND CURETTAGE WITH SUCTION;  Surgeon: Jessenia Le MD;  Location: [facility] MAIN OR;  Service: Obstetrics/Gynecology;  Laterality: N/A;    MOUTH SURGERY      TONSILLECTOMY         No family history on file.    Social History     Socioeconomic History    Marital status:    Vaping Use    Vaping status: Never Used   Substance and Sexual Activity    Alcohol use: Never    Drug use: Never    Sexual activity: Yes     Partners: Male           Objective   Physical Exam  No acute distress, regular rate and rhythm, normal conjunctiva, moist oral mucosa, alert and oriented, moving all extremities, abdomen soft and nontender without rebound or guarding, no tachypnea or increased work of breathing.  Procedures           ED Course      /75 (BP Location: Left arm, Patient Position: Lying)   Pulse 78   Temp 98.4 °F (36.9 °C) (Oral)   Resp 16   Ht 172.7 cm (68\")   Wt 123 kg (270 lb 11.6 oz)   LMP 03/05/2024 (Exact Date)   SpO2 99%   BMI 41.16 kg/m²   Labs Reviewed   COMPREHENSIVE METABOLIC PANEL - Abnormal; Notable for the following components:       Result Value    Glucose 110 (*)     Creatinine 0.52 (*)     BUN/Creatinine Ratio 28.8 (*)     All other components within normal limits    Narrative:     GFR Normal >60  Chronic Kidney Disease <60  Kidney Failure <15     CBC WITH AUTO DIFFERENTIAL - Abnormal; Notable for the following components:    WBC " 11.99 (*)     Lymphocyte % 18.6 (*)     Immature Grans % 0.7 (*)     Neutrophils, Absolute 8.75 (*)     Immature Grans, Absolute 0.08 (*)     All other components within normal limits   HCG, QUANTITATIVE, PREGNANCY    Narrative:     HCG Ranges by Gestational Age    Females - non-pregnant premenopausal   </= 1mIU/mL HCG  Females - postmenopausal               </= 7mIU/mL HCG    3 Weeks       5.4   -      72 mIU/mL  4 Weeks      10.2   -     708 mIU/mL  5 Weeks       217   -   8,245 mIU/mL  6 Weeks       152   -  32,177 mIU/mL  7 Weeks     4,059   - 153,767 mIU/mL  8 Weeks    31,366   - 149,094 mIU/mL  9 Weeks    59,109   - 135,901 mIU/mL  10 Weeks   44,186   - 170,409 mIU/mL  12 Weeks   27,107   - 201,615 mIU/mL  14 Weeks   24,302   -  93,646 mIU/mL  15 Weeks   12,540   -  69,747 mIU/mL  16 Weeks    8,904   -  55,332 mIU/mL  17 Weeks    8,240   -  51,793 mIU/mL  18 Weeks    9,649   -  55,271 mIU/mL      RHIG EVALUATION   DOSES OF RH IMMUNE GLOBULIN   CBC AND DIFFERENTIAL    Narrative:     The following orders were created for panel order CBC & Differential.  Procedure                               Abnormality         Status                     ---------                               -----------         ------                     CBC Auto Differential[[id number]]        Abnormal            Final result                 Please view results for these tests on the individual orders.     Medications - No data to display    US Ob Transvaginal    Result Date: 4/10/2024  Impression: Small amount of fluid within the endometrial canal without evidence of a discrete intrauterine pregnancy. Please correlate with serial beta hCG levels if clinically indicated Electronically Signed: Nathaniel Montgomery MD  4/10/2024 7:22 AM EDT  Workstation ID: [id number]    US Ob < 14 Weeks Single or First Gestation    Result Date: 4/10/2024  Impression: Small amount of fluid within the endometrial canal without evidence of a discrete  intrauterine pregnancy. Please correlate with serial beta hCG levels if clinically indicated Electronically Signed: Nathaniel Montgomery MD  4/10/2024 7:22 AM EDT  Workstation ID: [id number]                                          Medical Decision Making  Problems Addressed:  Threatened miscarriage in early pregnancy: complicated acute illness or injury    Amount and/or Complexity of Data Reviewed  Labs: ordered.  Radiology: ordered.    Risk  Prescription drug management.    hCG improved from prior check several days ago per records on patient's phone.  No yolk sac or fetal pole on ultrasound.  Has follow-up with Dr. Le.  Advise repeat hCG and ultrasound in 48 to 72 hours.  Patient agreeable with this plan.  Discussed with patient high likelihood of miscarriage or incomplete miscarriage.  Could also not completely rule out ectopic pregnancy and then follow-up with Dr. Le is extremely important.        Final diagnoses:   Threatened miscarriage in early pregnancy       ED Disposition  ED Disposition       ED Disposition   Discharge    Condition   Stable    Comment   --               Jessenia Le MD  [address] [place] [address]  [phone]    In 2 days  For repeat hCG check.         Medication List      No changes were made to your prescriptions during this visit.            Wesley Bean MD  04/10/24 1139

## 2024-04-10 NOTE — Clinical Note
[facility] EMERGENCY DEPARTMENT  [address] [place] [address]  Phone: [phone]    Lynn Soto was seen and treated in our emergency department on 4/10/2024.  She may return to work on 04/11/2024.         Thank you for choosing [facility].    Wesley Bean MD

## 2024-04-10 NOTE — Clinical Note
[facility] EMERGENCY DEPARTMENT  [address] [place] [address]  Phone: [phone]    Jeremy Soto accompanied Lynn Soto to the emergency department on 4/10/2024. They may return to work on 04/11/2024.        Thank you for choosing [facility].    Wesley Bean MD

## 2024-04-10 NOTE — Clinical Note
[facility] EMERGENCY DEPARTMENT  [address] [place] [address]  Phone: [phone]    Lynn Soto was seen and treated in our emergency department on 4/10/2024.  She may return to work on 04/11/2024.         Thank you for choosing [facility].    Wesley Bean MD

## 2024-05-10 ENCOUNTER — TRANSCRIBE ORDERS (OUTPATIENT)
Dept: ADMINISTRATIVE | Facility: HOSPITAL | Age: 24
End: 2024-05-10
Payer: MEDICAID

## 2024-05-10 ENCOUNTER — HOSPITAL ENCOUNTER (OUTPATIENT)
Dept: ULTRASOUND IMAGING | Facility: HOSPITAL | Age: 24
Discharge: HOME | End: 2024-05-10
Payer: MEDICAID

## 2024-05-10 DIAGNOSIS — O20.0 THREATENED ABORTION IN FIRST TRIMESTER: Primary | ICD-10-CM

## 2024-05-10 DIAGNOSIS — O20.0 THREATENED ABORTION IN FIRST TRIMESTER: ICD-10-CM

## 2024-05-10 PROCEDURE — 76802 OB US < 14 WKS ADDL FETUS: CPT

## 2024-05-10 PROCEDURE — 93976 VASCULAR STUDY: CPT

## 2024-05-10 PROCEDURE — 76801 OB US < 14 WKS SINGLE FETUS: CPT

## 2024-05-13 ENCOUNTER — HOSPITAL ENCOUNTER (EMERGENCY)
Facility: HOSPITAL | Age: 24
Discharge: HOME | End: 2024-05-14
Payer: MEDICAID

## 2024-05-13 ENCOUNTER — APPOINTMENT (OUTPATIENT)
Dept: ULTRASOUND IMAGING | Facility: HOSPITAL | Age: 24
End: 2024-05-13
Payer: MEDICAID

## 2024-05-13 DIAGNOSIS — O20.8 SUBCHORIONIC HEMORRHAGE OF PLACENTA IN FIRST TRIMESTER: Primary | ICD-10-CM

## 2024-05-13 DIAGNOSIS — O20.9 VAGINAL BLEEDING AFFECTING EARLY PREGNANCY: ICD-10-CM

## 2024-05-13 LAB
ABO GROUP BLD: NORMAL
ALBUMIN SERPL-MCNC: 3.9 G/DL (ref 3.5–5.2)
ALBUMIN/GLOB SERPL: 1.2 G/DL
ALP SERPL-CCNC: 86 U/L (ref 39–117)
ALT SERPL W P-5'-P-CCNC: 20 U/L (ref 1–33)
ANION GAP SERPL CALCULATED.3IONS-SCNC: 12 MMOL/L (ref 5–15)
AST SERPL-CCNC: 11 U/L (ref 1–32)
BASOPHILS # BLD AUTO: 0.03 10*3/MM3 (ref 0–0.2)
BASOPHILS NFR BLD AUTO: 0.3 % (ref 0–1.5)
BILIRUB SERPL-MCNC: 0.2 MG/DL (ref 0–1.2)
BILIRUB UR QL STRIP: NEGATIVE
BUN SERPL-MCNC: 10 MG/DL (ref 6–20)
BUN/CREAT SERPL: 20.4 (ref 7–25)
CALCIUM SPEC-SCNC: 9.3 MG/DL (ref 8.6–10.5)
CHLORIDE SERPL-SCNC: 104 MMOL/L (ref 98–107)
CLARITY UR: ABNORMAL
CLUE CELLS SPEC QL WET PREP: NORMAL
CO2 SERPL-SCNC: 23 MMOL/L (ref 22–29)
COLOR UR: YELLOW
CREAT SERPL-MCNC: 0.49 MG/DL (ref 0.57–1)
DEPRECATED RDW RBC AUTO: 42.6 FL (ref 37–54)
EGFRCR SERPLBLD CKD-EPI 2021: 136 ML/MIN/1.73
EOSINOPHIL # BLD AUTO: 0.12 10*3/MM3 (ref 0–0.4)
EOSINOPHIL NFR BLD AUTO: 1.1 % (ref 0.3–6.2)
ERYTHROCYTE [DISTWIDTH] IN BLOOD BY AUTOMATED COUNT: 13.5 % (ref 12.3–15.4)
GLOBULIN UR ELPH-MCNC: 3.2 GM/DL
GLUCOSE SERPL-MCNC: 103 MG/DL (ref 65–99)
GLUCOSE UR STRIP-MCNC: NEGATIVE MG/DL
HCG INTACT+B SERPL-ACNC: NORMAL MIU/ML
HCT VFR BLD AUTO: 38.7 % (ref 34–46.6)
HGB BLD-MCNC: 13.2 G/DL (ref 12–15.9)
HGB UR QL STRIP.AUTO: NEGATIVE
HOLD SPECIMEN: NORMAL
HOLD SPECIMEN: NORMAL
HYDATID CYST SPEC WET PREP: NORMAL
IMM GRANULOCYTES # BLD AUTO: 0.04 10*3/MM3 (ref 0–0.05)
IMM GRANULOCYTES NFR BLD AUTO: 0.4 % (ref 0–0.5)
KETONES UR QL STRIP: ABNORMAL
LEUKOCYTE ESTERASE UR QL STRIP.AUTO: NEGATIVE
LYMPHOCYTES # BLD AUTO: 2.13 10*3/MM3 (ref 0.7–3.1)
LYMPHOCYTES NFR BLD AUTO: 19.5 % (ref 19.6–45.3)
MCH RBC QN AUTO: 29.6 PG (ref 26.6–33)
MCHC RBC AUTO-ENTMCNC: 34.1 G/DL (ref 31.5–35.7)
MCV RBC AUTO: 86.8 FL (ref 79–97)
MONOCYTES # BLD AUTO: 0.57 10*3/MM3 (ref 0.1–0.9)
MONOCYTES NFR BLD AUTO: 5.2 % (ref 5–12)
NEUTROPHILS NFR BLD AUTO: 73.5 % (ref 42.7–76)
NEUTROPHILS NFR BLD AUTO: 8.05 10*3/MM3 (ref 1.7–7)
NITRITE UR QL STRIP: NEGATIVE
NRBC BLD AUTO-RTO: 0 /100 WBC (ref 0–0.2)
NUMBER OF DOSES: NORMAL
PH UR STRIP.AUTO: 6.5 [PH] (ref 5–8)
PLATELET # BLD AUTO: 270 10*3/MM3 (ref 140–450)
PMV BLD AUTO: 9.8 FL (ref 6–12)
POTASSIUM SERPL-SCNC: 4 MMOL/L (ref 3.5–5.2)
PROT SERPL-MCNC: 7.1 G/DL (ref 6–8.5)
PROT UR QL STRIP: NEGATIVE
RBC # BLD AUTO: 4.46 10*6/MM3 (ref 3.77–5.28)
RH BLD: POSITIVE
SODIUM SERPL-SCNC: 139 MMOL/L (ref 136–145)
SP GR UR STRIP: 1.02 (ref 1–1.03)
T VAGINALIS SPEC QL WET PREP: NORMAL
UROBILINOGEN UR QL STRIP: ABNORMAL
WBC NRBC COR # BLD AUTO: 10.94 10*3/MM3 (ref 3.4–10.8)
WBC SPEC QL WET PREP: NORMAL
WHOLE BLOOD HOLD COAG: NORMAL
WHOLE BLOOD HOLD SPECIMEN: NORMAL
YEAST GENITAL QL WET PREP: NORMAL

## 2024-05-13 PROCEDURE — 87491 CHLMYD TRACH DNA AMP PROBE: CPT | Performed by: PHYSICIAN ASSISTANT

## 2024-05-13 PROCEDURE — 87210 SMEAR WET MOUNT SALINE/INK: CPT | Performed by: PHYSICIAN ASSISTANT

## 2024-05-13 PROCEDURE — 87591 N.GONORRHOEAE DNA AMP PROB: CPT | Performed by: PHYSICIAN ASSISTANT

## 2024-05-13 PROCEDURE — 86900 BLOOD TYPING SEROLOGIC ABO: CPT | Performed by: PHYSICIAN ASSISTANT

## 2024-05-13 PROCEDURE — 81003 URINALYSIS AUTO W/O SCOPE: CPT | Performed by: PHYSICIAN ASSISTANT

## 2024-05-13 PROCEDURE — 76801 OB US < 14 WKS SINGLE FETUS: CPT

## 2024-05-13 PROCEDURE — 85025 COMPLETE CBC W/AUTO DIFF WBC: CPT | Performed by: PHYSICIAN ASSISTANT

## 2024-05-13 PROCEDURE — 99284 EMERGENCY DEPT VISIT MOD MDM: CPT

## 2024-05-13 PROCEDURE — 93976 VASCULAR STUDY: CPT

## 2024-05-13 PROCEDURE — 76817 TRANSVAGINAL US OBSTETRIC: CPT

## 2024-05-13 PROCEDURE — P9612 CATHETERIZE FOR URINE SPEC: HCPCS

## 2024-05-13 PROCEDURE — 80053 COMPREHEN METABOLIC PANEL: CPT | Performed by: PHYSICIAN ASSISTANT

## 2024-05-13 PROCEDURE — 86901 BLOOD TYPING SEROLOGIC RH(D): CPT | Performed by: PHYSICIAN ASSISTANT

## 2024-05-13 PROCEDURE — 84702 CHORIONIC GONADOTROPIN TEST: CPT | Performed by: PHYSICIAN ASSISTANT

## 2024-05-13 RX ORDER — SODIUM CHLORIDE 0.9 % (FLUSH) 0.9 %
10 SYRINGE (ML) INJECTION AS NEEDED
Status: DISCONTINUED | OUTPATIENT
Start: 2024-05-13 | End: 2024-05-14 | Stop reason: HOSPADM

## 2024-05-14 VITALS
DIASTOLIC BLOOD PRESSURE: 96 MMHG | HEART RATE: 91 BPM | BODY MASS INDEX: 39.86 KG/M2 | WEIGHT: 263 LBS | HEIGHT: 68 IN | OXYGEN SATURATION: 97 % | RESPIRATION RATE: 20 BRPM | SYSTOLIC BLOOD PRESSURE: 124 MMHG | TEMPERATURE: 98.5 F

## 2024-05-14 LAB
C TRACH RRNA CVX QL NAA+PROBE: NOT DETECTED
N GONORRHOEA RRNA SPEC QL NAA+PROBE: NOT DETECTED

## 2024-05-14 NOTE — DISCHARGE INSTRUCTIONS
Take Tylenol as needed for pain.    Strict pelvic rest, do not insert anything into the vagina, no sexual intercourse.  Recommend using pads instead of tampons    Follow-up with Dr. Le as scheduled on Wednesday    Return to the ER for new or worsening symptoms

## 2024-05-14 NOTE — ED PROVIDER NOTES
Subjective   History of Present Illness  Chief Complaint: Abdominal pain vaginal bleeding    Patient is a 23-year-old female no significant past medical history G3, P0 currently 10 weeks pregnant presents to the ER with complaints of abdominal pain vaginal bleeding that started 4 days ago.  She reports some spotting 4 days ago and was seen by OB/GYN Dr. Le sent over to the hospital for a ultrasound showed a small subchorionic hemorrhage.  She states that the bleeding stopped until today when she had increased bleeding and started to have some left sided abdominal pain.  She was some nausea but no vomiting.  But states this is normal for her.  No diarrhea.  She reports increased bleeding with a few clots.  She also reports some dysuria but no hematuria that she can see.  No chest pain shortness breath headache or fever.     PCP: Anne-Marie Ibarra  OB/GYN: Rey    History provided by:  Patient      Review of Systems   Constitutional:  Negative for fever.   HENT:  Negative for sore throat and trouble swallowing.    Eyes: Negative.    Respiratory:  Negative for shortness of breath and wheezing.    Cardiovascular:  Negative for chest pain.   Gastrointestinal:  Positive for abdominal pain. Negative for diarrhea, nausea and vomiting.   Endocrine: Negative.    Genitourinary:  Positive for dysuria, pelvic pain and vaginal bleeding.   Musculoskeletal:  Negative for myalgias.   Skin:  Negative for rash.   Allergic/Immunologic: Negative.    Neurological:  Negative for headaches.   Psychiatric/Behavioral:  Negative for behavioral problems.    All other systems reviewed and are negative.      No past medical history on file.    No Known Allergies    Past Surgical History:   Procedure Laterality Date    D & C WITH SUCTION N/A 1/11/2024    Procedure: DILATATION AND CURETTAGE WITH SUCTION;  Surgeon: Jessenia Le MD;  Location: [facility] MAIN OR;  Service: Obstetrics/Gynecology;  Laterality: N/A;    MOUTH SURGERY      TONSILLECTOMY          No family history on file.    Social History     Socioeconomic History    Marital status:    Vaping Use    Vaping status: Never Used   Substance and Sexual Activity    Alcohol use: Never    Drug use: Never    Sexual activity: Yes     Partners: Male           Objective   Physical Exam  Vitals and nursing note reviewed.   Constitutional:       Appearance: Normal appearance. She is well-developed and normal weight. She is not ill-appearing or toxic-appearing.   HENT:      Head: Normocephalic and atraumatic.   Eyes:      Pupils: Pupils are equal, round, and reactive to light.   Cardiovascular:      Rate and Rhythm: Normal rate and regular rhythm.      Pulses: Normal pulses.      Heart sounds: Normal heart sounds. No murmur heard.  Pulmonary:      Effort: Pulmonary effort is normal. No respiratory distress.      Breath sounds: Normal breath sounds. No wheezing.   Abdominal:      General: Bowel sounds are normal. There is no distension.      Palpations: Abdomen is soft.      Tenderness: There is no abdominal tenderness.   Genitourinary:     Comments: Vulva: No masses or lesions.  Vagina: No masses, lesions or discharge.  Moderate blood pooling in the vaginal vault, os closed with small amount of blood dripping  Cervix: No lesions or discharge.  Osseous closed.  No cervical motion tenderness.  Uterus: No masses palpable.  No tenderness.  Adnexa: No mass palpable.  No tenderness.  Exam chaperoned by ED RN Ruth  Skin:     General: Skin is warm and dry.      Capillary Refill: Capillary refill takes less than 2 seconds.      Findings: No rash.   Neurological:      General: No focal deficit present.      Mental Status: She is alert and oriented to person, place, and time.      Motor: No weakness.   Psychiatric:         Mood and Affect: Mood normal.         Behavior: Behavior normal.         Procedures           ED Course  ED Course as of 05/14/24 0023   Mon May 13, 2024   2347 US on 5/10/24    OTHER:             "                   There is a 2.4 x 1.1 x 0.5 cm crescent shaped hypoechoic area adjacent to the gestational sac.    []      ED Course User Index  [] Roosevelt Corinna, PA    /86 (BP Location: Right arm, Patient Position: Sitting)   Pulse 81   Temp 98.5 °F (36.9 °C) (Oral)   Resp 20   Ht 172.7 cm (68\")   Wt 119 kg (263 lb)   LMP 03/05/2024 (Exact Date)   SpO2 98%   BMI 39.99 kg/m²   Labs Reviewed   COMPREHENSIVE METABOLIC PANEL - Abnormal; Notable for the following components:       Result Value    Glucose 103 (*)     Creatinine 0.49 (*)     All other components within normal limits    Narrative:     GFR Normal >60  Chronic Kidney Disease <60  Kidney Failure <15     URINALYSIS W/ MICROSCOPIC IF INDICATED (NO CULTURE) - Abnormal; Notable for the following components:    Appearance, UA Cloudy (*)     Ketones, UA Trace (*)     All other components within normal limits    Narrative:     Urine microscopic not indicated.   CBC WITH AUTO DIFFERENTIAL - Abnormal; Notable for the following components:    WBC 10.94 (*)     Lymphocyte % 19.5 (*)     Neutrophils, Absolute 8.05 (*)     All other components within normal limits   WET PREP, GENITAL - Normal   CHLAMYDIA TRACHOMATIS, NEISSERIA GONORRHOEAE, PCR - Normal   RAINBOW DRAW    Narrative:     The following orders were created for panel order [place] Draw.  Procedure                               Abnormality         Status                     ---------                               -----------         ------                     Green Top (Gel)[[id number]]                                  Final result               Lavender Top[[id number]]                                     Final result               Gold Top - SST[[id number]]                                   Final result               Light Blue Top[[id number]]                                   Final result                 Please view results for these tests on the individual orders.   HCG, QUANTITATIVE, " PREGNANCY    Narrative:     HCG Ranges by Gestational Age    Females - non-pregnant premenopausal   </= 1mIU/mL HCG  Females - postmenopausal               </= 7mIU/mL HCG    3 Weeks       5.4   -      72 mIU/mL  4 Weeks      10.2   -     708 mIU/mL  5 Weeks       217   -   8,245 mIU/mL  6 Weeks       152   -  32,177 mIU/mL  7 Weeks     4,059   - 153,767 mIU/mL  8 Weeks    31,366   - 149,094 mIU/mL  9 Weeks    59,109   - 135,901 mIU/mL  10 Weeks   44,186   - 170,409 mIU/mL  12 Weeks   27,107   - 201,615 mIU/mL  14 Weeks   24,302   -  93,646 mIU/mL  15 Weeks   12,540   -  69,747 mIU/mL  16 Weeks    8,904   -  55,332 mIU/mL  17 Weeks    8,240   -  51,793 mIU/mL  18 Weeks    9,649   -  55,271 mIU/mL      RHIG EVALUATION   DOSES OF RH IMMUNE GLOBULIN   CBC AND DIFFERENTIAL    Narrative:     The following orders were created for panel order CBC & Differential.  Procedure                               Abnormality         Status                     ---------                               -----------         ------                     CBC Auto Differential[[id number]]        Abnormal            Final result                 Please view results for these tests on the individual orders.   GREEN TOP   LAVENDER TOP   GOLD TOP - SST   LIGHT BLUE TOP     Medications   sodium chloride 0.9 % flush 10 mL (has no administration in time range)     US Ob Transvaginal    Result Date: 2024  Impression: Intrauterine gestation corresponding to gestational age of 10 weeks and 0 days with a normal fetal heart rate of 176 bpm. Small to moderate amount of hypoechoic signal is seen surrounding the gestational sac likely representing a subchorionic hemorrhage. Electronically Signed: Nini Romreo MD  2024 11:42 PM EDT  Workstation ID: [id number]    US Ob < 14 Weeks Single or First Gestation    Result Date: 2024  Impression: Intrauterine gestation corresponding to gestational age of 10 weeks and 0 days with a normal fetal heart  rate of 176 bpm. Small to moderate amount of hypoechoic signal is seen surrounding the gestational sac likely representing a subchorionic hemorrhage. Electronically Signed: Nini Romero MD  5/13/2024 11:42 PM EDT  Workstation ID: [id number]                                            Medical Decision Making  Differential Dx (Includes but not limited to): Threatened miscarriage, subchorionic hemorrhage UTI, STD, ovarian cyst  Medical Records Reviewed: Records reviewed from ultrasound few days ago showing subchorionic hemorrhage but did show viable intrauterine pregnancy.  Beta-hCG 3 days ago 126,000  Labs: On my interpretation, CBC mild leukocytosis.  CMP glucose 103 creatinine 0.49.  Beta hCG 99,396.  RhoGAM not indicated.  Gonorrhea and committee negative.  Urinalysis negative for UTI or hematuria.  Wet prep negative.  Imaging: Imaging was reviewed by myself, independently interpreted by radiologist, intrauterine gestation corresponding to gestational age of 10 weeks 0 days with normal fetal heart rate of 176.  There is a subchorionic hemorrhage seen  Telemetry: N/A  Testing considered but not ordered: Chest x-ray patient denies chest pain or shortness of breath  Nature of Complaint: Acute  Admission vs Discharge: Discharge      Discussion: While in the ED IV was placed and labs were obtained appropriate PPE was worn during exam and throughout all encounters with the patient.  Patient had the above evaluation.  She is afebrile nontoxic appearance in no acute distress.  Pelvic exam performed to the ER RN at bedside, there is moderate amount of blood in the vaginal vault but no clots.  Os is closed.  No significant uterine or adnexal tenderness.  Lab work reassuring.  Beta-hCG did drop compared to 2 days ago however ultrasound does show viable intrauterine pregnancy measuring 10 weeks 0 days with a fetal heart rate of 176.  Subchorionic hemorrhage still seen and appears close to the similar size as seen a few days ago.   No indication for admission at this time.  Recommended strict pelvic rest, Tylenol for pain and follow-up with OB/GYN as scheduled, in 2 days.    Discharge plan and instructions were discussed with the patient who verbalized understanding and is in agreement with the plan, all questions were answered at this time.  Patient is aware of signs symptoms that would require immediate return to the emergency room.  Patient understands importance of following up with primary care provider for further evaluation and worsening concerns as well as blood pressure recheck in the next 4 weeks.    Patient was discharged in improved stable condition with an upright steady gait.    Patient is aware that discharge does not mean that nothing is wrong but indicates no emergencies present and they must continue care with follow-up as given below or physician of their choice.    Problems Addressed:  Subchorionic hemorrhage of placenta in first trimester: acute illness or injury  Vaginal bleeding affecting early pregnancy: acute illness or injury    Amount and/or Complexity of Data Reviewed  External Data Reviewed: labs and notes.  Labs: ordered. Decision-making details documented in ED Course.  Radiology: ordered. Decision-making details documented in ED Course.    Risk  Prescription drug management.        Final diagnoses:   Subchorionic hemorrhage of placenta in first trimester   Vaginal bleeding affecting early pregnancy       ED Disposition  ED Disposition       ED Disposition   Discharge    Condition   Stable    Comment   --               Anne-Marie Ibarra, APRALESIA  [address] [facility] [place] [address] [phone]    Schedule an appointment as soon as possible for a visit in 2 days  As needed, If symptoms worsen    Jessenia Le MD  [address] [facility] [place] [address] [phone]    Schedule an appointment as soon as possible for a visit in 2 days  As needed, If symptoms worsen         Medication List      No changes were made to your  prescriptions during this visit.            Nancy Albert, PA  05/14/24 0023

## 2024-10-15 ENCOUNTER — HOSPITAL ENCOUNTER (OUTPATIENT)
Facility: HOSPITAL | Age: 24
Setting detail: OBSERVATION
Discharge: HOME | End: 2024-10-16
Attending: STUDENT IN AN ORGANIZED HEALTH CARE EDUCATION/TRAINING PROGRAM | Admitting: STUDENT IN AN ORGANIZED HEALTH CARE EDUCATION/TRAINING PROGRAM
Payer: COMMERCIAL

## 2024-10-15 PROBLEM — R03.0 ELEVATED BLOOD PRESSURE READING: Status: ACTIVE | Noted: 2024-10-15

## 2024-10-15 LAB
ALBUMIN SERPL-MCNC: 3.2 G/DL (ref 3.5–5.2)
ALBUMIN/GLOB SERPL: 1 G/DL
ALP SERPL-CCNC: 131 U/L (ref 39–117)
ALT SERPL W P-5'-P-CCNC: 10 U/L (ref 1–33)
ANION GAP SERPL CALCULATED.3IONS-SCNC: 10.5 MMOL/L (ref 5–15)
AST SERPL-CCNC: 21 U/L (ref 1–32)
B PARAPERT DNA SPEC QL NAA+PROBE: NOT DETECTED
B PERT DNA SPEC QL NAA+PROBE: NOT DETECTED
BILIRUB SERPL-MCNC: 0.3 MG/DL (ref 0–1.2)
BUN SERPL-MCNC: 9 MG/DL (ref 6–20)
BUN/CREAT SERPL: 12.5 (ref 7–25)
C PNEUM DNA NPH QL NAA+NON-PROBE: NOT DETECTED
CALCIUM SPEC-SCNC: 8.6 MG/DL (ref 8.6–10.5)
CHLORIDE SERPL-SCNC: 104 MMOL/L (ref 98–107)
CO2 SERPL-SCNC: 20.5 MMOL/L (ref 22–29)
CREAT SERPL-MCNC: 0.72 MG/DL (ref 0.57–1)
DEPRECATED RDW RBC AUTO: 46.5 FL (ref 37–54)
EGFRCR SERPLBLD CKD-EPI 2021: 120.7 ML/MIN/1.73
ERYTHROCYTE [DISTWIDTH] IN BLOOD BY AUTOMATED COUNT: 13.7 % (ref 12.3–15.4)
FLUAV SUBTYP SPEC NAA+PROBE: NOT DETECTED
FLUBV RNA ISLT QL NAA+PROBE: NOT DETECTED
GLOBULIN UR ELPH-MCNC: 3.2 GM/DL
GLUCOSE SERPL-MCNC: 94 MG/DL (ref 65–99)
HADV DNA SPEC NAA+PROBE: NOT DETECTED
HCOV 229E RNA SPEC QL NAA+PROBE: NOT DETECTED
HCOV HKU1 RNA SPEC QL NAA+PROBE: NOT DETECTED
HCOV NL63 RNA SPEC QL NAA+PROBE: NOT DETECTED
HCOV OC43 RNA SPEC QL NAA+PROBE: NOT DETECTED
HCT VFR BLD AUTO: 36.4 % (ref 34–46.6)
HGB BLD-MCNC: 12.4 G/DL (ref 12–15.9)
HMPV RNA NPH QL NAA+NON-PROBE: NOT DETECTED
HPIV1 RNA ISLT QL NAA+PROBE: NOT DETECTED
HPIV2 RNA SPEC QL NAA+PROBE: NOT DETECTED
HPIV3 RNA NPH QL NAA+PROBE: NOT DETECTED
HPIV4 P GENE NPH QL NAA+PROBE: NOT DETECTED
LDH SERPL-CCNC: 314 U/L (ref 135–214)
M PNEUMO IGG SER IA-ACNC: NOT DETECTED
MCH RBC QN AUTO: 31.7 PG (ref 26.6–33)
MCHC RBC AUTO-ENTMCNC: 34.1 G/DL (ref 31.5–35.7)
MCV RBC AUTO: 93.1 FL (ref 79–97)
PLATELET # BLD AUTO: 168 10*3/MM3 (ref 140–450)
PMV BLD AUTO: 10.1 FL (ref 6–12)
POTASSIUM SERPL-SCNC: 4.1 MMOL/L (ref 3.5–5.2)
PROT SERPL-MCNC: 6.4 G/DL (ref 6–8.5)
RBC # BLD AUTO: 3.91 10*6/MM3 (ref 3.77–5.28)
RHINOVIRUS RNA SPEC NAA+PROBE: NOT DETECTED
RSV RNA NPH QL NAA+NON-PROBE: NOT DETECTED
SARS-COV-2 RNA NPH QL NAA+NON-PROBE: NOT DETECTED
SODIUM SERPL-SCNC: 135 MMOL/L (ref 136–145)
WBC NRBC COR # BLD AUTO: 12.2 10*3/MM3 (ref 3.4–10.8)

## 2024-10-15 PROCEDURE — 83615 LACTATE (LD) (LDH) ENZYME: CPT | Performed by: STUDENT IN AN ORGANIZED HEALTH CARE EDUCATION/TRAINING PROGRAM

## 2024-10-15 PROCEDURE — 85027 COMPLETE CBC AUTOMATED: CPT | Performed by: STUDENT IN AN ORGANIZED HEALTH CARE EDUCATION/TRAINING PROGRAM

## 2024-10-15 PROCEDURE — 0202U NFCT DS 22 TRGT SARS-COV-2: CPT | Performed by: STUDENT IN AN ORGANIZED HEALTH CARE EDUCATION/TRAINING PROGRAM

## 2024-10-15 PROCEDURE — 25010000002 METOCLOPRAMIDE PER 10 MG: Performed by: STUDENT IN AN ORGANIZED HEALTH CARE EDUCATION/TRAINING PROGRAM

## 2024-10-15 PROCEDURE — 80053 COMPREHEN METABOLIC PANEL: CPT | Performed by: STUDENT IN AN ORGANIZED HEALTH CARE EDUCATION/TRAINING PROGRAM

## 2024-10-15 RX ORDER — MULTIPLE VITAMINS W/ MINERALS TAB 9MG-400MCG
1 TAB ORAL DAILY
COMMUNITY

## 2024-10-15 RX ORDER — ASPIRIN 81 MG/1
81 TABLET ORAL DAILY
COMMUNITY

## 2024-10-15 RX ORDER — LABETALOL 200 MG/1
200 TABLET, FILM COATED ORAL EVERY 12 HOURS SCHEDULED
Status: DISCONTINUED | OUTPATIENT
Start: 2024-10-15 | End: 2024-10-16 | Stop reason: HOSPADM

## 2024-10-15 RX ORDER — ACETAMINOPHEN 325 MG/1
650 TABLET ORAL EVERY 6 HOURS PRN
Status: DISCONTINUED | OUTPATIENT
Start: 2024-10-15 | End: 2024-10-16 | Stop reason: HOSPADM

## 2024-10-15 RX ORDER — METOCLOPRAMIDE HYDROCHLORIDE 5 MG/ML
5 INJECTION INTRAMUSCULAR; INTRAVENOUS EVERY 8 HOURS PRN
Status: DISCONTINUED | OUTPATIENT
Start: 2024-10-15 | End: 2024-10-16 | Stop reason: HOSPADM

## 2024-10-15 RX ORDER — SODIUM CHLORIDE 0.9 % (FLUSH) 0.9 %
10 SYRINGE (ML) INJECTION AS NEEDED
Status: DISCONTINUED | OUTPATIENT
Start: 2024-10-15 | End: 2024-10-15 | Stop reason: HOSPADM

## 2024-10-15 RX ORDER — SODIUM CHLORIDE 0.9 % (FLUSH) 0.9 %
10 SYRINGE (ML) INJECTION EVERY 12 HOURS SCHEDULED
Status: DISCONTINUED | OUTPATIENT
Start: 2024-10-15 | End: 2024-10-15 | Stop reason: HOSPADM

## 2024-10-15 RX ORDER — CHOLECALCIFEROL (VITAMIN D3) 25 MCG
1000 TABLET ORAL DAILY
COMMUNITY

## 2024-10-15 RX ADMIN — ACETAMINOPHEN 650 MG: 325 TABLET, FILM COATED ORAL at 12:34

## 2024-10-15 RX ADMIN — LABETALOL HYDROCHLORIDE 200 MG: 200 TABLET, FILM COATED ORAL at 19:49

## 2024-10-15 RX ADMIN — Medication 10 ML: at 14:08

## 2024-10-15 RX ADMIN — METOCLOPRAMIDE 5 MG: 5 INJECTION, SOLUTION INTRAMUSCULAR; INTRAVENOUS at 14:07

## 2024-10-15 RX ADMIN — ACETAMINOPHEN 650 MG: 325 TABLET, FILM COATED ORAL at 20:46

## 2024-10-15 NOTE — PLAN OF CARE
Goal Outcome Evaluation:               Pt developed severe range Bps - MD aware. Rails padded. Pt reports intermittent headaches, lightheadedness/dizziness, and hot flashes/cold chills for 3 days.

## 2024-10-15 NOTE — LETTER
October 16, 2024     Patient: Lynn Soto   YOB: 2000   Date of Visit: 10/15/2024       To Whom It May Concern:    It is my medical opinion that Lynn Soto should remain out of work until follow up with OB on Monday October 21 .           Sincerely,  Dr.Tawana Le         No name on file    CC: No Recipients

## 2024-10-15 NOTE — NON STRESS TEST
Obstetrical Non-stress Test Interpretation     Name:  Lynn Soto  MRN: [id number]    23 y.o. female  at 32w0d    Indication: Elevated b/p       Baseline:150  Normal 110-160 bpm  Variability:   Moderate/Normal (amplitude 6-25 bpm)  Accelerations: Present (<32 weeks) 10 x 10 bpm   Decelerations: Absent   Contractions:  Absent       Impression:  Category I       Ericka Bazzi RN  10/15/2024  15:16 EDT

## 2024-10-15 NOTE — DISCHARGE SUMMARY
Date of Discharge:  10/15/2024    Presenting Problem/History of Present Illness:  Active Hospital Problems    Diagnosis  POA    **Elevated blood pressure reading [R03.0]  Yes      Resolved Hospital Problems   No resolved problems to display.          Discharge Diagnosis:   Mild preeclampsia, no severe features  Upper respiratory infection  Obesity  Recurrent pregnancy loss    Procedures Performed:         stress test    Consults:   Consults       No orders found from 2024 to 10/16/2024.            Hospital Course:  Patient is a 23 y.o. female  currently at 32w0d, presented with ***.      Pertinent Test Results:   Lab Results (last 72 hours)       Procedure Component Value Units Date/Time    Comprehensive Metabolic Panel [[id number]]  (Abnormal) Collected: 10/15/24 125    Specimen: Blood Updated: 10/15/24 1338     Glucose 94 mg/dL      BUN 9 mg/dL      Creatinine 0.72 mg/dL      Sodium 135 mmol/L      Potassium 4.1 mmol/L      Chloride 104 mmol/L      CO2 20.5 mmol/L      Calcium 8.6 mg/dL      Total Protein 6.4 g/dL      Albumin 3.2 g/dL      ALT (SGPT) 10 U/L      AST (SGOT) 21 U/L      Alkaline Phosphatase 131 U/L      Total Bilirubin 0.3 mg/dL      Globulin 3.2 gm/dL      A/G Ratio 1.0 g/dL      BUN/Creatinine Ratio 12.5     Anion Gap 10.5 mmol/L      eGFR 120.7 mL/min/1.73     Narrative:      GFR Normal >60  Chronic Kidney Disease <60  Kidney Failure <15      Lactate Dehydrogenase [[id number]]  (Abnormal) Collected: 10/15/24 125    Specimen: Blood Updated: 10/15/24 1338      U/L     CBC (No Diff) [[id number]]  (Abnormal) Collected: 10/15/24 1255    Specimen: Blood Updated: 10/15/24 1317     WBC 12.20 10*3/mm3      RBC 3.91 10*6/mm3      Hemoglobin 12.4 g/dL      Hematocrit 36.4 %      MCV 93.1 fL      MCH 31.7 pg      MCHC 34.1 g/dL      RDW 13.7 %      RDW-SD 46.5 fl      MPV 10.1 fL      Platelets 168 10*3/mm3           Imaging Results (Last 72 Hours)       ** No results found for  the last 72 hours. **            Condition on Discharge:  Good    Vital Signs:  Vitals:    10/15/24 1720 10/15/24 1735 10/15/24 1808 10/15/24 1839   BP: 173/100 (!) 156/107 138/88 144/90   BP Location: Right arm Right arm Right arm Right arm   Patient Position: Lying Lying Lying Lying   Pulse:       Resp: 20  18 17   Temp:   98.8 °F (37.1 °C)    TempSrc:       SpO2:   95% 95%   Weight:       Height:           Physical Exam:     General Appearance:    Alert, cooperative, in no acute distress   Lungs:     Clear to auscultation,respirations regular, even and                   unlabored    Heart:    Regular rhythm and normal rate.    Breast Exam:    Deferred   Abdomen:     Normal bowel sounds, no masses, no organomegaly, soft        non-tender, non-distended, no guarding, no rebound                 tenderness   Genitalia:    Deferred   Extremities:    Fetal Assessment:   Moves all extremities well, no edema, no cyanosis, no             Redness   {fetal assessment:38010}       Discharge Disposition:  Home    Discharge Medications:     Discharge Medications        ASK your doctor about these medications        Instructions Start Date   acetaminophen 650 MG 8 hr tablet  Commonly known as: Tylenol 8 Hour   650 mg, Oral, Every 8 Hours PRN      aspirin 81 MG EC tablet   81 mg, Daily      cholecalciferol 25 MCG (1000 UT) tablet  Commonly known as: VITAMIN D3   1,000 Units, Daily      ibuprofen 800 MG tablet  Commonly known as: ADVIL,MOTRIN   800 mg, Oral, Every 8 Hours PRN      multivitamin with minerals tablet tablet   1 tablet, Daily      ondansetron ODT 4 MG disintegrating tablet  Commonly known as: ZOFRAN-ODT   4 mg, Translingual, Every 6 Hours PRN               Activity at Discharge:     Follow-up Appointments  No future appointments.      Test Results Pending at Discharge       Jessenia Le MD  10/15/24  19:16 EDT

## 2024-10-16 ENCOUNTER — APPOINTMENT (OUTPATIENT)
Dept: ULTRASOUND IMAGING | Facility: HOSPITAL | Age: 24
End: 2024-10-16
Payer: COMMERCIAL

## 2024-10-16 VITALS
HEIGHT: 68 IN | DIASTOLIC BLOOD PRESSURE: 86 MMHG | WEIGHT: 293 LBS | TEMPERATURE: 98.2 F | HEART RATE: 87 BPM | OXYGEN SATURATION: 95 % | RESPIRATION RATE: 20 BRPM | SYSTOLIC BLOOD PRESSURE: 128 MMHG | BODY MASS INDEX: 44.41 KG/M2

## 2024-10-16 PROBLEM — J06.9 UPPER RESPIRATORY INFECTION: Status: ACTIVE | Noted: 2024-10-16

## 2024-10-16 PROBLEM — O14.03 MILD PREECLAMPSIA, THIRD TRIMESTER: Status: ACTIVE | Noted: 2024-10-16

## 2024-10-16 LAB
COLLECT DURATION TIME UR: 24 HRS
PROT 24H UR-MRATE: 1150 MG/24HOURS (ref 0–150)
SPECIMEN VOL 24H UR: 1250 ML

## 2024-10-16 PROCEDURE — G0378 HOSPITAL OBSERVATION PER HR: HCPCS

## 2024-10-16 PROCEDURE — 76819 FETAL BIOPHYS PROFIL W/O NST: CPT

## 2024-10-16 PROCEDURE — 84156 ASSAY OF PROTEIN URINE: CPT | Performed by: STUDENT IN AN ORGANIZED HEALTH CARE EDUCATION/TRAINING PROGRAM

## 2024-10-16 PROCEDURE — 81050 URINALYSIS VOLUME MEASURE: CPT | Performed by: STUDENT IN AN ORGANIZED HEALTH CARE EDUCATION/TRAINING PROGRAM

## 2024-10-16 RX ORDER — GUAIFENESIN AND DEXTROMETHORPHAN HYDROBROMIDE 600; 30 MG/1; MG/1
2 TABLET, EXTENDED RELEASE ORAL 2 TIMES DAILY PRN
Qty: 14 TABLET | Refills: 0 | Status: SHIPPED | OUTPATIENT
Start: 2024-10-16 | End: 2024-10-23

## 2024-10-16 RX ORDER — SENNOSIDES 8.6 MG
650 CAPSULE ORAL EVERY 8 HOURS PRN
Qty: 30 TABLET | Refills: 1 | Status: SHIPPED | OUTPATIENT
Start: 2024-10-16

## 2024-10-16 RX ORDER — METOCLOPRAMIDE 5 MG/1
5 TABLET ORAL EVERY 6 HOURS PRN
Qty: 20 TABLET | Refills: 0 | Status: SHIPPED | OUTPATIENT
Start: 2024-10-16

## 2024-10-16 RX ORDER — LABETALOL 200 MG/1
200 TABLET, FILM COATED ORAL 2 TIMES DAILY
Qty: 60 TABLET | Refills: 2 | Status: SHIPPED | OUTPATIENT
Start: 2024-10-16

## 2024-10-16 RX ADMIN — ACETAMINOPHEN 650 MG: 325 TABLET, FILM COATED ORAL at 14:27

## 2024-10-16 RX ADMIN — ACETAMINOPHEN 650 MG: 325 TABLET, FILM COATED ORAL at 08:23

## 2024-10-16 RX ADMIN — ACETAMINOPHEN 650 MG: 325 TABLET, FILM COATED ORAL at 02:10

## 2024-10-16 RX ADMIN — LABETALOL HYDROCHLORIDE 200 MG: 200 TABLET, FILM COATED ORAL at 08:23

## 2024-10-16 NOTE — NURSING NOTE
Pt B/P was elevated @1904 during shift change. I manually took her B/P then  I Admin. Labetalol @ 1944 due to verification and Clarifying the Orders with MD Le. I explained to the Patient that Dr Le will be at bedside to check in on her. In the meantime I was instructed to limit visitors and to continue to check her bp manually if she has an elevated bp with the automatic BP machine. I educated pt on Tylenol and admin to help decrease her Temp. I completed the Respiratory Panel. Since her current BP is decrease I will recheck in 1 hr.

## 2024-10-16 NOTE — DISCHARGE SUMMARY
Date of Discharge:  10/16/2024    Presenting Problem/History of Present Illness:  23 y.o. female  currently at 32w1d  ***      Discharge Diagnosis: ***      Hospital Course:  Patient is a 23 y.o. female  currently at 32w1d, presented with ***.      Pertinent Test Results:   Lab Results (last 72 hours)       Procedure Component Value Units Date/Time    Protein, Urine, 24 Hour - Urine, Clean Catch [[id number]]  (Abnormal) Collected: 10/16/24 1245    Specimen: 24 Hour Urine from Urine, Clean Catch Updated: 10/16/24 1323     Protein, 24H Urine 1,150.0 mg/24hours      24H Urine Volume 1,250 mL      Time (Hours) 24 hrs     Respiratory Panel PCR w/COVID-19(SARS-CoV-2) RENATO/POONAM/THAIS/PAD/COR/AMALIA In-House, NP Swab in UTM/VTM, 2 HR TAT - Swab, Nasopharynx [[id number]]  (Normal) Collected: 10/15/24 2035    Specimen: Swab from Nasopharynx Updated: 10/15/24 214     ADENOVIRUS, PCR Not Detected     Coronavirus 229E Not Detected     Coronavirus HKU1 Not Detected     Coronavirus NL63 Not Detected     Coronavirus OC43 Not Detected     COVID19 Not Detected     Human Metapneumovirus Not Detected     Human Rhinovirus/Enterovirus Not Detected     Influenza A PCR Not Detected     Influenza B PCR Not Detected     Parainfluenza Virus 1 Not Detected     Parainfluenza Virus 2 Not Detected     Parainfluenza Virus 3 Not Detected     Parainfluenza Virus 4 Not Detected     RSV, PCR Not Detected     Bordetella pertussis pcr Not Detected     Bordetella parapertussis PCR Not Detected     Chlamydophila pneumoniae PCR Not Detected     Mycoplasma pneumo by PCR Not Detected    Narrative:      In the setting of a positive respiratory panel with a viral infection PLUS a negative procalcitonin without other underlying concern for bacterial infection, consider observing off antibiotics or discontinuation of antibiotics and continue supportive care. If the respiratory panel is positive for atypical bacterial infection (Bordetella pertussis,  Chlamydophila pneumoniae, or Mycoplasma pneumoniae), consider antibiotic de-escalation to target atypical bacterial infection.    Comprehensive Metabolic Panel [[id number]]  (Abnormal) Collected: 10/15/24 1255    Specimen: Blood Updated: 10/15/24 1338     Glucose 94 mg/dL      BUN 9 mg/dL      Creatinine 0.72 mg/dL      Sodium 135 mmol/L      Potassium 4.1 mmol/L      Chloride 104 mmol/L      CO2 20.5 mmol/L      Calcium 8.6 mg/dL      Total Protein 6.4 g/dL      Albumin 3.2 g/dL      ALT (SGPT) 10 U/L      AST (SGOT) 21 U/L      Alkaline Phosphatase 131 U/L      Total Bilirubin 0.3 mg/dL      Globulin 3.2 gm/dL      A/G Ratio 1.0 g/dL      BUN/Creatinine Ratio 12.5     Anion Gap 10.5 mmol/L      eGFR 120.7 mL/min/1.73     Narrative:      GFR Normal >60  Chronic Kidney Disease <60  Kidney Failure <15      Lactate Dehydrogenase [[id number]]  (Abnormal) Collected: 10/15/24 1255    Specimen: Blood Updated: 10/15/24 1338      U/L     CBC (No Diff) [[id number]]  (Abnormal) Collected: 10/15/24 1255    Specimen: Blood Updated: 10/15/24 1317     WBC 12.20 10*3/mm3      RBC 3.91 10*6/mm3      Hemoglobin 12.4 g/dL      Hematocrit 36.4 %      MCV 93.1 fL      MCH 31.7 pg      MCHC 34.1 g/dL      RDW 13.7 %      RDW-SD 46.5 fl      MPV 10.1 fL      Platelets 168 10*3/mm3           Imaging Results (Last 72 Hours)       ** No results found for the last 72 hours. **            Condition on Discharge:  Good    Vital Signs:  Vitals:    10/16/24 0700 10/16/24 0837 10/16/24 0930 10/16/24 1130   BP: 137/81 147/96 121/83 128/86   BP Location: Right arm Left arm Right arm Right arm   Patient Position: Lying Sitting Sitting Sitting   Pulse: 85 93 87    Resp: 18 20 20    Temp: 98.6 °F (37 °C) 99.5 °F (37.5 °C) 98.2 °F (36.8 °C)    TempSrc: Oral Oral Oral    SpO2: 95% 95% 95%    Weight:       Height:           Physical Exam:     General Appearance:    Alert, cooperative, in no acute distress       Abdomen:    Soft, nontender.     Pelvic  "Exam:    {Cervicalexam:16832}   Extremities:    Fetal Assessment:   Moves all extremities well, no edema, no cyanosis, no             redness   {Rcategories:51006} [place]: {[place]:[id number]}       Discharge Disposition:  Home    Discharge Medications:     Discharge Medications        ASK your doctor about these medications        Instructions Start Date   acetaminophen 650 MG 8 hr tablet  Commonly known as: Tylenol 8 Hour   650 mg, Oral, Every 8 Hours PRN      aspirin 81 MG EC tablet   81 mg, Daily      cholecalciferol 25 MCG (1000 UT) tablet  Commonly known as: VITAMIN D3   1,000 Units, Daily      ibuprofen 800 MG tablet  Commonly known as: ADVIL,MOTRIN   800 mg, Oral, Every 8 Hours PRN      multivitamin with minerals tablet tablet   1 tablet, Daily      ondansetron ODT 4 MG disintegrating tablet  Commonly known as: ZOFRAN-ODT   4 mg, Translingual, Every 6 Hours PRN               Follow-up Appointments  Follow-up appointment with {AIXA Brewster:[id number]} in {0-10:54085::\"1\"} {Time; units w/plural:11::\"week\"}    Test Results Pending at Discharge       Jessenia Le MD  10/16/24  14:05 EDT  "

## 2024-10-16 NOTE — NURSING NOTE
Obstetrical Non-stress Test Interpretation     Name:  Lynn Soto  MRN: [id number]    23 y.o. female  at 32w1d    Indication: 32 week with hypertension       Baseline: Normal 110-160 bpm  Variability:   Moderate/Normal (amplitude 6-25 bpm)  Accelerations: Present (<32 weeks) 10 x 10 bpm   Decelerations: Absent   Contractions:  Absent       Impression:  Category I   Strip reviewed and agreed with by Dr. Rey Flower RN  10/16/2024  11:27 EDT

## 2024-10-16 NOTE — PLAN OF CARE
Goal Outcome Evaluation:  Plan of Care Reviewed With: patient, spouse        Progress: improving             Patient discharged to home with instructions and follow up appt on Monday .

## 2024-10-16 NOTE — NON STRESS TEST
"Obstetrical Non-stress Test Interpretation     Name:  Lynn Soot  MRN: [id number]    23 y.o. female  at 32w1d    Indication: NST pt on monitor for 1 hour      Fetal Assessment  Fetal Movement: active  Fetal HR Assessment Method: external  Fetal HR (beats/min): 165  Fetal HR Baseline: tachycardia  Fetal HR Variability: moderate (amplitude range 6 to 25 bpm)  Fetal HR Accelerations: greater than/equal to 15 bpm, lasting at least 15 seconds  Fetal HR Decelerations: absent  Sinusoidal Pattern Present: absent  Fetal HR Tracing Category: Category II    /85 (BP Location: Right arm, Patient Position: Lying)   Pulse 89   Temp 99.1 °F (37.3 °C) (Oral)   Resp 23   Ht 172.7 cm (68\")   Wt 133 kg (294 lb)   LMP 2024 (Exact Date)   SpO2 95%   BMI 44.70 kg/m²     Reason for test:    Date of Test: 10/16/2024  Time frame of test:   MONICA NST Interpretation: Reactive       Marisol Uribe RN & Yao RENO RN  10/16/[phone]    "

## 2024-10-16 NOTE — PLAN OF CARE
Goal Outcome Evaluation:  Plan of Care Reviewed With: patient      Pt blood pressure is stabilizing after administrating Labatelol. Pt had a NST complete by Labor Nurse Pt is voiding appropriately, pt is a 24 hr urine collection.

## 2024-10-17 NOTE — SIGNIFICANT NOTE
Case Management Discharge Note                Selected Continued Care - Discharged on 10/16/2024 Admission date: 10/15/2024 - Discharge disposition: Home or Self Care              Transportation Services  Private: Car    Final Discharge Disposition Code: (P) 01 - home or self-care

## 2024-10-25 ENCOUNTER — APPOINTMENT (OUTPATIENT)
Dept: ULTRASOUND IMAGING | Facility: HOSPITAL | Age: 24
End: 2024-10-25
Payer: COMMERCIAL

## 2024-10-25 ENCOUNTER — HOSPITAL ENCOUNTER (INPATIENT)
Facility: HOSPITAL | Age: 24
LOS: 6 days | Discharge: HOME | End: 2024-10-31
Attending: STUDENT IN AN ORGANIZED HEALTH CARE EDUCATION/TRAINING PROGRAM | Admitting: STUDENT IN AN ORGANIZED HEALTH CARE EDUCATION/TRAINING PROGRAM
Payer: COMMERCIAL

## 2024-10-25 DIAGNOSIS — O14.93 PRE-ECLAMPSIA IN THIRD TRIMESTER: Primary | ICD-10-CM

## 2024-10-25 PROBLEM — O14.90 PREECLAMPSIA: Status: ACTIVE | Noted: 2024-10-25

## 2024-10-25 LAB
ALBUMIN SERPL-MCNC: 3.1 G/DL (ref 3.5–5.2)
ALBUMIN SERPL-MCNC: 3.2 G/DL (ref 3.5–5.2)
ALBUMIN/GLOB SERPL: 1 G/DL
ALBUMIN/GLOB SERPL: 1.1 G/DL
ALP SERPL-CCNC: 135 U/L (ref 39–117)
ALP SERPL-CCNC: 151 U/L (ref 39–117)
ALT SERPL W P-5'-P-CCNC: 43 U/L (ref 1–33)
ALT SERPL W P-5'-P-CCNC: 49 U/L (ref 1–33)
ANION GAP SERPL CALCULATED.3IONS-SCNC: 10.4 MMOL/L (ref 5–15)
ANION GAP SERPL CALCULATED.3IONS-SCNC: 11.6 MMOL/L (ref 5–15)
ANISOCYTOSIS BLD QL: ABNORMAL
AST SERPL-CCNC: 45 U/L (ref 1–32)
AST SERPL-CCNC: 50 U/L (ref 1–32)
BASOPHILS # BLD AUTO: 0.06 10*3/MM3 (ref 0–0.2)
BASOPHILS NFR BLD AUTO: 0.6 % (ref 0–1.5)
BILIRUB SERPL-MCNC: 0.2 MG/DL (ref 0–1.2)
BILIRUB SERPL-MCNC: 0.2 MG/DL (ref 0–1.2)
BUN SERPL-MCNC: 14 MG/DL (ref 6–20)
BUN SERPL-MCNC: 14 MG/DL (ref 6–20)
BUN/CREAT SERPL: 20.3 (ref 7–25)
BUN/CREAT SERPL: 20.6 (ref 7–25)
CALCIUM SPEC-SCNC: 8.6 MG/DL (ref 8.6–10.5)
CALCIUM SPEC-SCNC: 8.9 MG/DL (ref 8.6–10.5)
CHLORIDE SERPL-SCNC: 104 MMOL/L (ref 98–107)
CHLORIDE SERPL-SCNC: 107 MMOL/L (ref 98–107)
CO2 SERPL-SCNC: 19.4 MMOL/L (ref 22–29)
CO2 SERPL-SCNC: 20.6 MMOL/L (ref 22–29)
CREAT SERPL-MCNC: 0.68 MG/DL (ref 0.57–1)
CREAT SERPL-MCNC: 0.69 MG/DL (ref 0.57–1)
DEPRECATED RDW RBC AUTO: 46.5 FL (ref 37–54)
DEPRECATED RDW RBC AUTO: 46.7 FL (ref 37–54)
EGFRCR SERPLBLD CKD-EPI 2021: 125.2 ML/MIN/1.73
EGFRCR SERPLBLD CKD-EPI 2021: 125.7 ML/MIN/1.73
EOSINOPHIL # BLD AUTO: 0.22 10*3/MM3 (ref 0–0.4)
EOSINOPHIL # BLD MANUAL: 0.37 10*3/MM3 (ref 0–0.4)
EOSINOPHIL NFR BLD AUTO: 2 % (ref 0.3–6.2)
EOSINOPHIL NFR BLD MANUAL: 3 % (ref 0.3–6.2)
ERYTHROCYTE [DISTWIDTH] IN BLOOD BY AUTOMATED COUNT: 13.9 % (ref 12.3–15.4)
ERYTHROCYTE [DISTWIDTH] IN BLOOD BY AUTOMATED COUNT: 14 % (ref 12.3–15.4)
GLOBULIN UR ELPH-MCNC: 2.8 GM/DL
GLOBULIN UR ELPH-MCNC: 3.2 GM/DL
GLUCOSE SERPL-MCNC: 108 MG/DL (ref 65–99)
GLUCOSE SERPL-MCNC: 71 MG/DL (ref 65–99)
HCT VFR BLD AUTO: 36.5 % (ref 34–46.6)
HCT VFR BLD AUTO: 38.7 % (ref 34–46.6)
HGB BLD-MCNC: 12.1 G/DL (ref 12–15.9)
HGB BLD-MCNC: 13.4 G/DL (ref 12–15.9)
IMM GRANULOCYTES # BLD AUTO: 0.33 10*3/MM3 (ref 0–0.05)
IMM GRANULOCYTES NFR BLD AUTO: 3.1 % (ref 0–0.5)
LDH SERPL-CCNC: 269 U/L (ref 135–214)
LDH SERPL-CCNC: 298 U/L (ref 135–214)
LYMPHOCYTES # BLD AUTO: 1.8 10*3/MM3 (ref 0.7–3.1)
LYMPHOCYTES # BLD MANUAL: 1.37 10*3/MM3 (ref 0.7–3.1)
LYMPHOCYTES NFR BLD AUTO: 16.7 % (ref 19.6–45.3)
MCH RBC QN AUTO: 31.4 PG (ref 26.6–33)
MCH RBC QN AUTO: 32.4 PG (ref 26.6–33)
MCHC RBC AUTO-ENTMCNC: 33.2 G/DL (ref 31.5–35.7)
MCHC RBC AUTO-ENTMCNC: 34.6 G/DL (ref 31.5–35.7)
MCV RBC AUTO: 93.7 FL (ref 79–97)
MCV RBC AUTO: 94.8 FL (ref 79–97)
MONOCYTES # BLD AUTO: 0.82 10*3/MM3 (ref 0.1–0.9)
MONOCYTES NFR BLD AUTO: 7.6 % (ref 5–12)
NEUTROPHILS # BLD AUTO: 10.73 10*3/MM3 (ref 1.7–7)
NEUTROPHILS NFR BLD AUTO: 7.57 10*3/MM3 (ref 1.7–7)
NEUTROPHILS NFR BLD AUTO: 70 % (ref 42.7–76)
NEUTROPHILS NFR BLD MANUAL: 86 % (ref 42.7–76)
NEUTS VAC BLD QL SMEAR: ABNORMAL
NRBC BLD AUTO-RTO: 0.2 /100 WBC (ref 0–0.2)
PLAT MORPH BLD: NORMAL
PLATELET # BLD AUTO: 218 10*3/MM3 (ref 140–450)
PLATELET # BLD AUTO: 220 10*3/MM3 (ref 140–450)
PMV BLD AUTO: 9.1 FL (ref 6–12)
PMV BLD AUTO: 9.3 FL (ref 6–12)
POIKILOCYTOSIS BLD QL SMEAR: ABNORMAL
POLYCHROMASIA BLD QL SMEAR: ABNORMAL
POTASSIUM SERPL-SCNC: 4.3 MMOL/L (ref 3.5–5.2)
POTASSIUM SERPL-SCNC: 4.6 MMOL/L (ref 3.5–5.2)
PROT SERPL-MCNC: 5.9 G/DL (ref 6–8.5)
PROT SERPL-MCNC: 6.4 G/DL (ref 6–8.5)
RBC # BLD AUTO: 3.85 10*6/MM3 (ref 3.77–5.28)
RBC # BLD AUTO: 4.13 10*6/MM3 (ref 3.77–5.28)
SODIUM SERPL-SCNC: 135 MMOL/L (ref 136–145)
SODIUM SERPL-SCNC: 138 MMOL/L (ref 136–145)
TOXIC GRANULATION: ABNORMAL
VARIANT LYMPHS NFR BLD MANUAL: 1 % (ref 0–5)
VARIANT LYMPHS NFR BLD MANUAL: 10 % (ref 19.6–45.3)
WBC NRBC COR # BLD AUTO: 10.8 10*3/MM3 (ref 3.4–10.8)
WBC NRBC COR # BLD AUTO: 12.48 10*3/MM3 (ref 3.4–10.8)

## 2024-10-25 PROCEDURE — 3E033VJ INTRODUCTION OF OTHER HORMONE INTO PERIPHERAL VEIN, PERCUTANEOUS APPROACH: ICD-10-PCS | Performed by: STUDENT IN AN ORGANIZED HEALTH CARE EDUCATION/TRAINING PROGRAM

## 2024-10-25 PROCEDURE — 25010000002 MORPHINE PER 10 MG: Performed by: STUDENT IN AN ORGANIZED HEALTH CARE EDUCATION/TRAINING PROGRAM

## 2024-10-25 PROCEDURE — 80053 COMPREHEN METABOLIC PANEL: CPT | Performed by: STUDENT IN AN ORGANIZED HEALTH CARE EDUCATION/TRAINING PROGRAM

## 2024-10-25 PROCEDURE — 85027 COMPLETE CBC AUTOMATED: CPT | Performed by: STUDENT IN AN ORGANIZED HEALTH CARE EDUCATION/TRAINING PROGRAM

## 2024-10-25 PROCEDURE — 76815 OB US LIMITED FETUS(S): CPT

## 2024-10-25 PROCEDURE — 85007 BL SMEAR W/DIFF WBC COUNT: CPT | Performed by: STUDENT IN AN ORGANIZED HEALTH CARE EDUCATION/TRAINING PROGRAM

## 2024-10-25 PROCEDURE — 83615 LACTATE (LD) (LDH) ENZYME: CPT | Performed by: STUDENT IN AN ORGANIZED HEALTH CARE EDUCATION/TRAINING PROGRAM

## 2024-10-25 PROCEDURE — 25010000002 MAGNESIUM SULFATE 20 GM/500ML SOLUTION: Performed by: STUDENT IN AN ORGANIZED HEALTH CARE EDUCATION/TRAINING PROGRAM

## 2024-10-25 PROCEDURE — 25010000002 BETAMETHASONE ACET & SOD PHOS PER 4 MG: Performed by: STUDENT IN AN ORGANIZED HEALTH CARE EDUCATION/TRAINING PROGRAM

## 2024-10-25 PROCEDURE — 76705 ECHO EXAM OF ABDOMEN: CPT

## 2024-10-25 PROCEDURE — 99221 1ST HOSP IP/OBS SF/LOW 40: CPT | Performed by: NURSE PRACTITIONER

## 2024-10-25 PROCEDURE — 85025 COMPLETE CBC W/AUTO DIFF WBC: CPT | Performed by: STUDENT IN AN ORGANIZED HEALTH CARE EDUCATION/TRAINING PROGRAM

## 2024-10-25 PROCEDURE — 25010000002 PENICILLIN G POTASSIUM PER 600000 UNITS: Performed by: STUDENT IN AN ORGANIZED HEALTH CARE EDUCATION/TRAINING PROGRAM

## 2024-10-25 RX ORDER — BETAMETHASONE SODIUM PHOSPHATE AND BETAMETHASONE ACETATE 3; 3 MG/ML; MG/ML
12 INJECTION, SUSPENSION INTRA-ARTICULAR; INTRALESIONAL; INTRAMUSCULAR; SOFT TISSUE EVERY 24 HOURS
Status: DISCONTINUED | OUTPATIENT
Start: 2024-10-25 | End: 2024-10-25

## 2024-10-25 RX ORDER — LIDOCAINE HYDROCHLORIDE 10 MG/ML
0.5 INJECTION, SOLUTION EPIDURAL; INFILTRATION; INTRACAUDAL; PERINEURAL ONCE AS NEEDED
Status: DISCONTINUED | OUTPATIENT
Start: 2024-10-25 | End: 2024-10-26

## 2024-10-25 RX ORDER — LABETALOL 200 MG/1
200 TABLET, FILM COATED ORAL EVERY 12 HOURS SCHEDULED
Status: DISCONTINUED | OUTPATIENT
Start: 2024-10-25 | End: 2024-10-26

## 2024-10-25 RX ORDER — ONDANSETRON 4 MG/1
8 TABLET, ORALLY DISINTEGRATING ORAL EVERY 8 HOURS PRN
Status: DISCONTINUED | OUTPATIENT
Start: 2024-10-25 | End: 2024-10-26

## 2024-10-25 RX ORDER — MAGNESIUM SULFATE HEPTAHYDRATE 40 MG/ML
2 INJECTION, SOLUTION INTRAVENOUS CONTINUOUS
Status: DISCONTINUED | OUTPATIENT
Start: 2024-10-25 | End: 2024-10-26

## 2024-10-25 RX ORDER — ACETAMINOPHEN 325 MG/1
650 TABLET ORAL EVERY 4 HOURS PRN
Status: DISCONTINUED | OUTPATIENT
Start: 2024-10-25 | End: 2024-10-26

## 2024-10-25 RX ORDER — BENZONATATE 100 MG/1
200 CAPSULE ORAL 3 TIMES DAILY PRN
Status: DISCONTINUED | OUTPATIENT
Start: 2024-10-25 | End: 2024-10-26

## 2024-10-25 RX ORDER — MISOPROSTOL 100 MCG
25 TABLET ORAL
Status: DISCONTINUED | OUTPATIENT
Start: 2024-10-26 | End: 2024-10-26

## 2024-10-25 RX ORDER — SODIUM CHLORIDE 0.9 % (FLUSH) 0.9 %
10 SYRINGE (ML) INJECTION AS NEEDED
Status: DISCONTINUED | OUTPATIENT
Start: 2024-10-25 | End: 2024-10-26

## 2024-10-25 RX ORDER — SODIUM CHLORIDE 9 MG/ML
40 INJECTION, SOLUTION INTRAVENOUS AS NEEDED
Status: DISPENSED | OUTPATIENT
Start: 2024-10-25 | End: 2024-10-25

## 2024-10-25 RX ORDER — BETAMETHASONE SODIUM PHOSPHATE AND BETAMETHASONE ACETATE 3; 3 MG/ML; MG/ML
12 INJECTION, SUSPENSION INTRA-ARTICULAR; INTRALESIONAL; INTRAMUSCULAR; SOFT TISSUE ONCE
Status: COMPLETED | OUTPATIENT
Start: 2024-10-26 | End: 2024-10-26

## 2024-10-25 RX ORDER — CALCIUM GLUCONATE 94 MG/ML
1 INJECTION, SOLUTION INTRAVENOUS ONCE AS NEEDED
Status: DISCONTINUED | OUTPATIENT
Start: 2024-10-25 | End: 2024-10-26

## 2024-10-25 RX ORDER — GUAIFENESIN 600 MG/1
1200 TABLET, EXTENDED RELEASE ORAL 2 TIMES DAILY
COMMUNITY
End: 2024-10-31 | Stop reason: HOSPADM

## 2024-10-25 RX ORDER — BENZONATATE 200 MG/1
200 CAPSULE ORAL 3 TIMES DAILY PRN
COMMUNITY
End: 2024-10-31 | Stop reason: HOSPADM

## 2024-10-25 RX ORDER — HYDROXYZINE HYDROCHLORIDE 25 MG/1
50 TABLET, FILM COATED ORAL EVERY 12 HOURS PRN
Status: DISCONTINUED | OUTPATIENT
Start: 2024-10-25 | End: 2024-10-26

## 2024-10-25 RX ORDER — ONDANSETRON 2 MG/ML
4 INJECTION INTRAMUSCULAR; INTRAVENOUS EVERY 8 HOURS PRN
Status: DISCONTINUED | OUTPATIENT
Start: 2024-10-25 | End: 2024-10-26

## 2024-10-25 RX ORDER — SODIUM CHLORIDE 0.9 % (FLUSH) 0.9 %
10 SYRINGE (ML) INJECTION EVERY 12 HOURS SCHEDULED
Status: DISCONTINUED | OUTPATIENT
Start: 2024-10-25 | End: 2024-10-26

## 2024-10-25 RX ORDER — NIFEDIPINE 10 MG/1
20 CAPSULE ORAL ONCE
Status: COMPLETED | OUTPATIENT
Start: 2024-10-25 | End: 2024-10-25

## 2024-10-25 RX ADMIN — MAGNESIUM SULFATE HEPTAHYDRATE 2 G/HR: 40 INJECTION, SOLUTION INTRAVENOUS at 20:53

## 2024-10-25 RX ADMIN — LABETALOL HYDROCHLORIDE 200 MG: 200 TABLET, FILM COATED ORAL at 20:31

## 2024-10-25 RX ADMIN — BETAMETHASONE SODIUM PHOSPHATE AND BETAMETHASONE ACETATE 12 MG: 3; 3 INJECTION, SUSPENSION INTRA-ARTICULAR; INTRALESIONAL; INTRAMUSCULAR at 13:47

## 2024-10-25 RX ADMIN — NIFEDIPINE 20 MG: 10 CAPSULE ORAL at 12:24

## 2024-10-25 RX ADMIN — MAGNESIUM SULFATE HEPTAHYDRATE 2 G/HR: 40 INJECTION, SOLUTION INTRAVENOUS at 14:19

## 2024-10-25 RX ADMIN — MORPHINE SULFATE 4 MG: 4 INJECTION, SOLUTION INTRAMUSCULAR; INTRAVENOUS at 19:07

## 2024-10-25 RX ADMIN — SODIUM CHLORIDE 5 MILLION UNITS: 900 INJECTION INTRAVENOUS at 20:33

## 2024-10-25 RX ADMIN — HYDROXYZINE HYDROCHLORIDE 50 MG: 25 TABLET, FILM COATED ORAL at 21:50

## 2024-10-25 RX ADMIN — ACETAMINOPHEN 650 MG: 325 TABLET, FILM COATED ORAL at 13:50

## 2024-10-25 RX ADMIN — LABETALOL HYDROCHLORIDE 200 MG: 200 TABLET, FILM COATED ORAL at 09:59

## 2024-10-25 RX ADMIN — ACETAMINOPHEN 650 MG: 325 TABLET, FILM COATED ORAL at 20:31

## 2024-10-25 NOTE — CONSULTS
"Prenatal Consult    Referring OB:  Rey    Reason for Consultation: potential  delivery of female at 33w3 weeks gestation    Maternal History:   Lynn is a 23 y.o. yr old  with: preeclampsia  Estimated Date of Delivery: 12/10/24    Prenatal History, Physical Exam, US and labs were reviewed and pertinent findings as below:  Steroids: Started on this admission, 1st dose: 10/25 1347  US report: resolved renal pelvis dilation    Current Delivery Plan: IOL, vaginal anticipated    MATERNAL PRENATAL LABS:      MBT: O Pos  AB: Neg   RUBELLA: Immune  GBS Culture: Unknown  HBsAg: Negative   RPR: Negative  HIV: Negative  HEP C Ab: Negative  HSV Hx: Not done  UDS: Not done    Genetic Testing: abnormal Qnatal, indeterminate for 1p36 deletion-MFM couseled on risk of microdeletion associated with hypotonia and intellectual disabilities.      CONSULT  Present for discussion were: maternal grandmother and mother    Concerns voiced by the Parents/Family included: none at this time    Anticipated Infant's name: \"Rajesh\"     Discussion topics included:   Discussed implications of maternal preeclampsia and need for  delivery. Also discussed respiratory distress syndrome, beneficial effects of steroids, apnea of prematurity, feeding difficulty, and temperature instability.    Estimated length of stay: ~ ~5-6 weeks    Discussed the importance of providing human milk (mother's or donor).  Discussed policies and procedures for NICU.  Discussed structure and function of NICU providers and unit.     ASSESSMENT  Diagnosis for Consultation: Maternal preeclampsia with risk for  delivery at 33w3d  gestation.    PLAN  NNP and NICU team will plan to attend delivery if delivers  at <36 weeks or CS <37 weeks, or at request of OB.   resuscitation: full resuscitation    Additional comments: MOB plans to provide breast milk    Thank you for allowing us to participate in the care of your patient. Should any " further questions or concerns arise please do not hesitate to contact us.    Jacy Marie NNP-BC  10/25/24  15:51 EDT

## 2024-10-25 NOTE — H&P
CAMILLA Oliveros  Obstetric History and Physical     Chief Complaint: severe range blood pressures and new onset right upper quadrant pain, with known preeclampsia    Subjective     Patient is a 23 y.o. female  currently at 33w3d, who presents with new onset right upper quadrant pain that has worsened since yesterday evening. She reports her pain did not improve despite medical therapy with tylenol at home. She denies vision changes, but she does report intermittent headache. She denies palpitations, worsening dyspnea, or SOA. She also denies worsening LE swelling.   Patient has been on labetalol 200mg BID. She has not taken AM dose of medical therapy. She has been on this medical therapy for preeclampsia without severe features.   During observation, she had severe range Bps with systolic BP as high as 170s, and this did not improve despite patient receiving her scheduled antihypertensive medical therapy. She did have to receive a dose of procardia for acute hypertensive crisis while being observed.     Her prenatal care is c/b obesity, recurrent pregnancy loss, GBS unknown (collected on 10/24/24 in office and result pending), new diagnosis severe preeclampsia, and indeterminate microdeletion on Qnatal- patient has followed with [facility] and declined amniocentesis, Qnatal low risk for trisomy, fetus with renal pelvis dilation which resolved on last US.       Prenatal Information:  Prenatal Results       Initial Prenatal Labs       Test Value Reference Range Date Time    Hemoglobin  13.2 g/dL 12.0 - 15.9 24      ^ 13.0 g/dL 12.0 - 16.0 05/10/24 0746       13.3 g/dL 12.0 - 15.9 04/10/24 0526    Hematocrit  38.7 % 34.0 - 46.6 242      ^ 37.7 % 36.0 - 46.0 05/10/24 0746       40.6 % 34.0 - 46.6 04/10/24 0526    Platelets  270 10*3/mm3 140 - 450 24 2202      ^ 233 10*3/uL 140 - 440 05/10/24 0746       269 10*3/mm3 140 - 450 04/10/24 0526    Rubella IgG        Hepatitis B SAg        Hepatitis C Ab         RPR        T. Pallidum Ab         ABO  O   05/13/24 2202    Rh  Positive   05/13/24 2202    Antibody Screen        HIV        Urine Culture        Gonorrhea  Not Detected  Not Detected 05/13/24 2154    Chlamydia  Not Detected  Not Detected, Invalid 05/13/24 2154    TSH        HgB A1c         Varicella IgG        Hemoglobinopathy Fractionation        Hemoglobinopathy (genetic testing)        Cystic fibrosis                   Fetal testing        Test Value Reference Range Date Time    NIPT        MSAFP        AFP-4                  2nd and 3rd Trimester       Test Value Reference Range Date Time    Hemoglobin (repeated)  12.1 g/dL 12.0 - 15.9 10/25/24 0940       12.4 g/dL 12.0 - 15.9 10/15/24 1255    Hematocrit (repeated)  36.5 % 34.0 - 46.6 10/25/24 0940       36.4 % 34.0 - 46.6 10/15/24 1255    Platelets   220 10*3/mm3 140 - 450 10/25/24 0940       168 10*3/mm3 140 - 450 10/15/24 1255       270 10*3/mm3 140 - 450 05/13/24 2202      ^ 233 10*3/uL 140 - 440 05/10/24 0746       269 10*3/mm3 140 - 450 04/10/24 0526    1 hour GTT         Antibody Screen (repeated)        3rd TM syphilis scrn (repeated)  RPR         3rd TM syphilis scrn (repeated) TP-Ab        3rd TM syphilis screen TB-Ab (FTA)        Syphilis cascade test TP-Ab (EIA)        Syphilis cascade TPPA        GTT Fasting        GTT 1 Hr        GTT 2 Hr        GTT 3 Hr        Group B Strep                  Other testing        Test Value Reference Range Date Time    [place]vo IgG         CMV IgG                   Drug Screening       Test Value Reference Range Date Time    Amphetamine Screen        Barbiturate Screen        Benzodiazepine Screen        Methadone Screen        Phencyclidine Screen        Opiates Screen        THC Screen        Cocaine Screen        Propoxyphene Screen        Buprenorphine Screen        Methamphetamine Screen        Oxycodone Screen        Tricyclic Antidepressants Screen                  Legend    ^: Historical                           External Prenatal Results       Pregnancy Outside Results - Transcribed From Office Records - See Scanned Records For Details       Test Value Date Time    ABO  O  05/13/24 2202    Rh  Positive  05/13/24 2202    Antibody Screen       Varicella IgG       Rubella       Hgb  12.1 g/dL 10/25/24 0940       12.4 g/dL 10/15/24 1255       13.2 g/dL 05/13/24 2202      ^ 13.0 g/dL 05/10/24 0746       13.3 g/dL 04/10/24 0526    Hct  36.5 % 10/25/24 0940       36.4 % 10/15/24 1255       38.7 % 05/13/24 2202      ^ 37.7 % 05/10/24 0746       40.6 % 04/10/24 0526    HgB A1c        1h GTT       3h GTT Fasting       3h GTT 1 hour       3h GTT 2 hour       3h GTT 3 hour        Gonorrhea (discrete)  Not Detected  05/13/24 2154    Chlamydia (discrete)  Not Detected  05/13/24 2154    RPR       Syphils cascade: TP-Ab (FTA)       TP-Ab       TP-Ab (EIA)       TPPA       HBsAg       Herpes Simplex Virus PCR       Herpes Simplex VIrus Culture       HIV       Hep C RNA Quant PCR       Hep C Antibody       AFP       NIPT       Cystic Fibroisis        Group B Strep       GBS Susceptibility to Clindamycin       GBS Susceptibility to Erythromycin       Fetal Fibronectin       Genetic Testing, Maternal Blood                 Drug Screening       Test Value Date Time    Urine Drug Screen       Amphetamine Screen       Barbiturate Screen       Benzodiazepine Screen       Methadone Screen       Phencyclidine Screen       Opiates Screen       THC Screen       Cocaine Screen       Propoxyphene Screen       Buprenorphine Screen       Methamphetamine Screen       Oxycodone Screen       Tricyclic Antidepressants Screen                 Legend    ^: Historical                             Past OB History: recurrent pregnancy loss        Past Medical History: History reviewed. No pertinent past medical history.  Anxiety    Past Surgical History Past Surgical History:   Procedure Laterality Date    D & C WITH SUCTION N/A 1/11/2024    Procedure:  DILATATION AND CURETTAGE WITH SUCTION;  Surgeon: Jessenia Le MD;  Location: [facility] MAIN OR;  Service: Obstetrics/Gynecology;  Laterality: N/A;    MOUTH SURGERY      TONSILLECTOMY          Family History: Family History   Problem Relation Age of Onset    Heart disease Maternal Grandfather       Social History:  reports that she has never smoked. She has never been exposed to tobacco smoke. She has never used smokeless tobacco.   reports no history of alcohol use.   reports no history of drug use.        General ROS: Pertinent items are noted in HPI    Objective      Vitals:     Vitals:    10/25/24 1219 10/25/24 1224 10/25/24 1227 10/25/24 1231   BP:  167/80 167/80 162/81   BP Location:       Patient Position:       Pulse: 59 68 59 55   Resp:       Temp:       TempSrc:       SpO2: 97%      Weight:       Height:           Fetal Heart Rate Assessment:   Cat I tracing    [place]:   Quiet      Physical Exam:     General Appearance:    Alert, cooperative, in no acute distress   Lungs:     Clear to auscultation,respirations regular.    Heart:    Regular rhythm and normal rate.   Breast Exam:    Deferred   Abdomen:     Normal bowel sounds, no masses, soft non-tender,         non-distended, no guarding, no rebound tenderness   Pelvic Exam:    Ft/50/-2    Presentation: vertex confirmed on US    Fetal weight: 4#4.5oz, 1941 grams   Extremities:   Moves all extremities well, trace edema, no cyanosis, no              redness   Skin:   No bleeding, bruising or rash   Neurologic:   No focal neurologic defect          Laboratory Results:   Lab Results (last 48 hours)       Procedure Component Value Units Date/Time    Lactate Dehydrogenase [[id number]]  (Abnormal) Collected: 10/25/24 0940    Specimen: Blood Updated: 10/25/24 1056      U/L     Comprehensive Metabolic Panel [[id number]]  (Abnormal) Collected: 10/25/24 0940    Specimen: Blood Updated: 10/25/24 1041     Glucose 71 mg/dL      BUN 14 mg/dL      Creatinine 0.68 mg/dL       Sodium 138 mmol/L      Potassium 4.3 mmol/L      Chloride 107 mmol/L      CO2 20.6 mmol/L      Calcium 8.9 mg/dL      Total Protein 5.9 g/dL      Albumin 3.1 g/dL      ALT (SGPT) 43 U/L      AST (SGOT) 45 U/L      Alkaline Phosphatase 135 U/L      Total Bilirubin 0.2 mg/dL      Globulin 2.8 gm/dL      A/G Ratio 1.1 g/dL      BUN/Creatinine Ratio 20.6     Anion Gap 10.4 mmol/L      eGFR 125.7 mL/min/1.73     Narrative:      GFR Normal >60  Chronic Kidney Disease <60  Kidney Failure <15      CBC & Differential [[id number]]  (Abnormal) Collected: 10/25/24 0940    Specimen: Blood Updated: 10/25/24 1023    Narrative:      The following orders were created for panel order CBC & Differential.  Procedure                               Abnormality         Status                     ---------                               -----------         ------                     CBC Auto Differential[[id number]]        Abnormal            Final result                 Please view results for these tests on the individual orders.    CBC Auto Differential [[id number]]  (Abnormal) Collected: 10/25/24 0940    Specimen: Blood Updated: 10/25/24 1023     WBC 10.80 10*3/mm3      RBC 3.85 10*6/mm3      Hemoglobin 12.1 g/dL      Hematocrit 36.5 %      MCV 94.8 fL      MCH 31.4 pg      MCHC 33.2 g/dL      RDW 14.0 %      RDW-SD 46.7 fl      MPV 9.1 fL      Platelets 220 10*3/mm3      Neutrophil % 70.0 %      Lymphocyte % 16.7 %      Monocyte % 7.6 %      Eosinophil % 2.0 %      Basophil % 0.6 %      Immature Grans % 3.1 %      Neutrophils, Absolute 7.57 10*3/mm3      Lymphocytes, Absolute 1.80 10*3/mm3      Monocytes, Absolute 0.82 10*3/mm3      Eosinophils, Absolute 0.22 10*3/mm3      Basophils, Absolute 0.06 10*3/mm3      Immature Grans, Absolute 0.33 10*3/mm3      nRBC 0.2 /100 WBC             Other Studies:      Assessment & Plan     Active Problems:    * No active hospital problems. *         Assessment:  1.  Intrauterine pregnancy at 33w3d  gestation with reactive fetal status.    2.  Severe preeclampsia with severe features - severe range Bps, and elevated LFTs  3.  Obstetrical history significant for is remarkable for obesity, abnormal Qnatal testing see above  4.  GBS status: Unknown, collected on 10/24 in office and result pending, PPX PCN started     Plan:  1. Patient presented with RUQ pain, sustained severe range Bps, and elevated LFTs with known preeclampsia diagnosed at 32 weeks.   Patient admitted for delivery planning and IOL. Liver US ordered and shows no evidence of lesions and appears normal, kidneys appear normal, normal appearing gallbladder.   NICU consultation on admission to discuss expectations post delivery.   BMZ for fetal lung maturity ordered.   IV magnesium sulfate ordered for seizure PPX.   PCN ordered for GBS unknown due to  induction.   Per ACOG guidelines recommendation for delivery for severe preeclampsia at 34 weeks of gestation or if signs of worsening severe symptoms.   MFM consultation on admission with Dr. Ashley Dickerson who recommends proceeding with IOL at this time due to sustained SR Bps and elevation in LFTs with RUQ pain. Due to patient not having a vaginal delivery previously and with high likelihood of having multi-day induction, she recommends proceeding with cervical ripening at this time while patient receives BMZ therapy. Dr. Dickerson also recommends completing BMZ in 12 hours and not 24 hours. Also will perform routine labs Q6 hours to monitor disease process with low threshold to expedite delivery with  section if change in patient status or concern for rapid disease progression. Following discussion with maternal fetal medicine specialist, MFM and also patient, and family, the plan was to proceed forward with above plan.   ''  IOL:   Patient received CRB at 1940 without any difficulty.   I recommend starting cytotec dosing around 0400 for dual therapy following second dose of BMZ.     2.  Plan of care has been reviewed with patient.  3.  Risks, benefits of treatment plan have been discussed.  4.  All questions have been answered.  5. Severe preeclampsia: severe features being severe range Bps requiring antihypertensive therapy with procardia PO 20mg x 1 and elevated LFTs. IV magnesium sulfate ordered for seizure PPX. Continue PO labetalol 200mg BID.   6.  Qnatal with microdeletion, renal pelvis dilation that had resolved on last US with MFM 9/2024, patient has received extensive counseling on MFM on findings such as association with microdeletion such as hypotonia, seizures,  intelluctual disability, and brain abnormalities. MFM counseling completed this pregnancy and patient declined amniocentesis.                 Jessenia Le MD  10/25/24  09:58 EDT

## 2024-10-25 NOTE — PLAN OF CARE
"Goal Outcome Evaluation:  Plan of Care Reviewed With: patient, spouse, parent           Outcome Evaluation: Pt here this am admitted for right upper abd pain, rating \"5-6\" feels \"sharp at times.\" B/p elevated at home. Pt did not take labetalol as prescribed. Pt denies any visual changes, no headache, edema 3+ in lower ext's and hands puffy\" Reflexes normal, no clonus. Explained plan of care, medications as ordered, Magnesium sulfate ordered and side effects. Headache improved after tylenol, b/p w/i normal range. Will continue to monitor.                             "

## 2024-10-26 ENCOUNTER — ANESTHESIA EVENT (OUTPATIENT)
Dept: LABOR AND DELIVERY | Facility: HOSPITAL | Age: 24
End: 2024-10-26
Payer: COMMERCIAL

## 2024-10-26 ENCOUNTER — ANESTHESIA (OUTPATIENT)
Dept: LABOR AND DELIVERY | Facility: HOSPITAL | Age: 24
End: 2024-10-26
Payer: COMMERCIAL

## 2024-10-26 LAB
ALBUMIN SERPL-MCNC: 3.2 G/DL (ref 3.5–5.2)
ALBUMIN/GLOB SERPL: 1 G/DL
ALP SERPL-CCNC: 148 U/L (ref 39–117)
ALT SERPL W P-5'-P-CCNC: 53 U/L (ref 1–33)
ANION GAP SERPL CALCULATED.3IONS-SCNC: 10.7 MMOL/L (ref 5–15)
AST SERPL-CCNC: 50 U/L (ref 1–32)
BASOPHILS # BLD AUTO: 0.05 10*3/MM3 (ref 0–0.2)
BASOPHILS NFR BLD AUTO: 0.4 % (ref 0–1.5)
BILIRUB SERPL-MCNC: 0.3 MG/DL (ref 0–1.2)
BUN SERPL-MCNC: 15 MG/DL (ref 6–20)
BUN/CREAT SERPL: 23.1 (ref 7–25)
CALCIUM SPEC-SCNC: 8 MG/DL (ref 8.6–10.5)
CHLORIDE SERPL-SCNC: 101 MMOL/L (ref 98–107)
CO2 SERPL-SCNC: 19.3 MMOL/L (ref 22–29)
CREAT SERPL-MCNC: 0.65 MG/DL (ref 0.57–1)
DEPRECATED RDW RBC AUTO: 47.4 FL (ref 37–54)
EGFRCR SERPLBLD CKD-EPI 2021: 127.1 ML/MIN/1.73
EOSINOPHIL # BLD AUTO: 0.01 10*3/MM3 (ref 0–0.4)
EOSINOPHIL NFR BLD AUTO: 0.1 % (ref 0.3–6.2)
ERYTHROCYTE [DISTWIDTH] IN BLOOD BY AUTOMATED COUNT: 14.1 % (ref 12.3–15.4)
GLOBULIN UR ELPH-MCNC: 3.1 GM/DL
GLUCOSE SERPL-MCNC: 114 MG/DL (ref 65–99)
HCT VFR BLD AUTO: 40.6 % (ref 34–46.6)
HGB BLD-MCNC: 13.6 G/DL (ref 12–15.9)
IMM GRANULOCYTES # BLD AUTO: 0.41 10*3/MM3 (ref 0–0.05)
IMM GRANULOCYTES NFR BLD AUTO: 3 % (ref 0–0.5)
LDH SERPL-CCNC: 267 U/L (ref 135–214)
LYMPHOCYTES # BLD AUTO: 1.32 10*3/MM3 (ref 0.7–3.1)
LYMPHOCYTES NFR BLD AUTO: 9.7 % (ref 19.6–45.3)
MCH RBC QN AUTO: 31.6 PG (ref 26.6–33)
MCHC RBC AUTO-ENTMCNC: 33.5 G/DL (ref 31.5–35.7)
MCV RBC AUTO: 94.4 FL (ref 79–97)
MONOCYTES # BLD AUTO: 0.16 10*3/MM3 (ref 0.1–0.9)
MONOCYTES NFR BLD AUTO: 1.2 % (ref 5–12)
NEUTROPHILS NFR BLD AUTO: 11.72 10*3/MM3 (ref 1.7–7)
NEUTROPHILS NFR BLD AUTO: 85.6 % (ref 42.7–76)
NRBC BLD AUTO-RTO: 0 /100 WBC (ref 0–0.2)
PLATELET # BLD AUTO: 238 10*3/MM3 (ref 140–450)
PMV BLD AUTO: 9.3 FL (ref 6–12)
POTASSIUM SERPL-SCNC: 4.9 MMOL/L (ref 3.5–5.2)
PROT SERPL-MCNC: 6.3 G/DL (ref 6–8.5)
RBC # BLD AUTO: 4.3 10*6/MM3 (ref 3.77–5.28)
SODIUM SERPL-SCNC: 131 MMOL/L (ref 136–145)
WBC NRBC COR # BLD AUTO: 13.67 10*3/MM3 (ref 3.4–10.8)

## 2024-10-26 PROCEDURE — 25010000002 MAGNESIUM SULFATE 20 GM/500ML SOLUTION: Performed by: STUDENT IN AN ORGANIZED HEALTH CARE EDUCATION/TRAINING PROGRAM

## 2024-10-26 PROCEDURE — 25010000002 BUPIVACAINE (PF) 0.25 % SOLUTION: Performed by: ANESTHESIOLOGY

## 2024-10-26 PROCEDURE — 25010000002 KETOROLAC TROMETHAMINE PER 15 MG: Performed by: OBSTETRICS & GYNECOLOGY

## 2024-10-26 PROCEDURE — 25810000003 LACTATED RINGERS PER 1000 ML: Performed by: STUDENT IN AN ORGANIZED HEALTH CARE EDUCATION/TRAINING PROGRAM

## 2024-10-26 PROCEDURE — 25010000002 BUPIVACAINE IN DEXTROSE 0.75-8.25 % SOLUTION: Performed by: NURSE ANESTHETIST, CERTIFIED REGISTERED

## 2024-10-26 PROCEDURE — 25010000002 MORPHINE PER 10 MG: Performed by: NURSE ANESTHETIST, CERTIFIED REGISTERED

## 2024-10-26 PROCEDURE — C1755 CATHETER, INTRASPINAL: HCPCS | Performed by: ANESTHESIOLOGY

## 2024-10-26 PROCEDURE — 25010000002 CEFAZOLIN PER 500 MG: Performed by: OBSTETRICS & GYNECOLOGY

## 2024-10-26 PROCEDURE — 25010000002 BETAMETHASONE ACET & SOD PHOS PER 4 MG: Performed by: STUDENT IN AN ORGANIZED HEALTH CARE EDUCATION/TRAINING PROGRAM

## 2024-10-26 PROCEDURE — 88307 TISSUE EXAM BY PATHOLOGIST: CPT | Performed by: OBSTETRICS & GYNECOLOGY

## 2024-10-26 PROCEDURE — 80053 COMPREHEN METABOLIC PANEL: CPT | Performed by: STUDENT IN AN ORGANIZED HEALTH CARE EDUCATION/TRAINING PROGRAM

## 2024-10-26 PROCEDURE — 25010000002 OXYTOCIN PER 10 UNITS: Performed by: NURSE ANESTHETIST, CERTIFIED REGISTERED

## 2024-10-26 PROCEDURE — 85025 COMPLETE CBC W/AUTO DIFF WBC: CPT | Performed by: STUDENT IN AN ORGANIZED HEALTH CARE EDUCATION/TRAINING PROGRAM

## 2024-10-26 PROCEDURE — 83615 LACTATE (LD) (LDH) ENZYME: CPT | Performed by: STUDENT IN AN ORGANIZED HEALTH CARE EDUCATION/TRAINING PROGRAM

## 2024-10-26 PROCEDURE — 25810000003 SODIUM CHLORIDE 0.9 % SOLUTION: Performed by: OBSTETRICS & GYNECOLOGY

## 2024-10-26 PROCEDURE — 25010000002 PENICILLIN G POTASSIUM PER 600000 UNITS: Performed by: STUDENT IN AN ORGANIZED HEALTH CARE EDUCATION/TRAINING PROGRAM

## 2024-10-26 RX ORDER — OXYTOCIN/0.9 % SODIUM CHLORIDE 30/500 ML
125 PLASTIC BAG, INJECTION (ML) INTRAVENOUS ONCE AS NEEDED
Status: DISCONTINUED | OUTPATIENT
Start: 2024-10-26 | End: 2024-11-01 | Stop reason: HOSPADM

## 2024-10-26 RX ORDER — BUPIVACAINE HYDROCHLORIDE 2.5 MG/ML
INJECTION, SOLUTION EPIDURAL; INFILTRATION; INTRACAUDAL
Status: COMPLETED
Start: 2024-10-26 | End: 2024-10-26

## 2024-10-26 RX ORDER — MORPHINE SULFATE 1 MG/ML
INJECTION, SOLUTION EPIDURAL; INTRATHECAL; INTRAVENOUS AS NEEDED
Status: DISCONTINUED | OUTPATIENT
Start: 2024-10-26 | End: 2024-10-26 | Stop reason: SURG

## 2024-10-26 RX ORDER — LABETALOL 200 MG/1
200 TABLET, FILM COATED ORAL 2 TIMES DAILY
Status: DISCONTINUED | OUTPATIENT
Start: 2024-10-26 | End: 2024-10-29

## 2024-10-26 RX ORDER — KETOROLAC TROMETHAMINE 30 MG/ML
30 INJECTION, SOLUTION INTRAMUSCULAR; INTRAVENOUS EVERY 6 HOURS
Status: DISCONTINUED | OUTPATIENT
Start: 2024-10-27 | End: 2024-10-26 | Stop reason: SDUPTHER

## 2024-10-26 RX ORDER — OXYTOCIN/0.9 % SODIUM CHLORIDE 30/500 ML
250 PLASTIC BAG, INJECTION (ML) INTRAVENOUS CONTINUOUS
Status: ACTIVE | OUTPATIENT
Start: 2024-10-26 | End: 2024-10-26

## 2024-10-26 RX ORDER — CARBOPROST TROMETHAMINE 250 UG/ML
250 INJECTION, SOLUTION INTRAMUSCULAR AS NEEDED
Status: DISCONTINUED | OUTPATIENT
Start: 2024-10-26 | End: 2024-10-27 | Stop reason: HOSPADM

## 2024-10-26 RX ORDER — PRENATAL VIT/IRON FUM/FOLIC AC 27MG-0.8MG
1 TABLET ORAL EVERY EVENING
Status: DISCONTINUED | OUTPATIENT
Start: 2024-10-27 | End: 2024-10-27

## 2024-10-26 RX ORDER — ACETAMINOPHEN 500 MG
1000 TABLET ORAL ONCE
Status: COMPLETED | OUTPATIENT
Start: 2024-10-26 | End: 2024-10-26

## 2024-10-26 RX ORDER — BUPIVACAINE HYDROCHLORIDE 2.5 MG/ML
INJECTION, SOLUTION EPIDURAL; INFILTRATION; INTRACAUDAL AS NEEDED
Status: DISCONTINUED | OUTPATIENT
Start: 2024-10-26 | End: 2024-10-26 | Stop reason: SURG

## 2024-10-26 RX ORDER — FENTANYL/BUPIVACAINE/NS/PF 2-1250MCG
PLASTIC BAG, INJECTION (ML) INJECTION
Status: COMPLETED
Start: 2024-10-26 | End: 2024-10-26

## 2024-10-26 RX ORDER — BUPIVACAINE HYDROCHLORIDE 7.5 MG/ML
INJECTION, SOLUTION INTRASPINAL AS NEEDED
Status: DISCONTINUED | OUTPATIENT
Start: 2024-10-26 | End: 2024-10-26 | Stop reason: SURG

## 2024-10-26 RX ORDER — MORPHINE SULFATE 1 MG/ML
INJECTION, SOLUTION EPIDURAL; INTRATHECAL; INTRAVENOUS AS NEEDED
Status: DISCONTINUED | OUTPATIENT
Start: 2024-10-26 | End: 2024-10-26

## 2024-10-26 RX ORDER — ACETAMINOPHEN 500 MG
1000 TABLET ORAL EVERY 6 HOURS SCHEDULED
Status: DISCONTINUED | OUTPATIENT
Start: 2024-10-27 | End: 2024-10-26 | Stop reason: SDUPTHER

## 2024-10-26 RX ORDER — ACETAMINOPHEN 325 MG/1
650 TABLET ORAL EVERY 6 HOURS
Status: DISCONTINUED | OUTPATIENT
Start: 2024-10-28 | End: 2024-11-01 | Stop reason: HOSPADM

## 2024-10-26 RX ORDER — SODIUM CHLORIDE, SODIUM LACTATE, POTASSIUM CHLORIDE, CALCIUM CHLORIDE 600; 310; 30; 20 MG/100ML; MG/100ML; MG/100ML; MG/100ML
75 INJECTION, SOLUTION INTRAVENOUS CONTINUOUS
Status: DISCONTINUED | OUTPATIENT
Start: 2024-10-26 | End: 2024-10-26

## 2024-10-26 RX ORDER — FENTANYL/BUPIVACAINE/NS/PF 2-1250MCG
PLASTIC BAG, INJECTION (ML) INJECTION CONTINUOUS
Status: DISCONTINUED | OUTPATIENT
Start: 2024-10-26 | End: 2024-10-26

## 2024-10-26 RX ORDER — DIPHENHYDRAMINE HYDROCHLORIDE 50 MG/ML
25 INJECTION INTRAMUSCULAR; INTRAVENOUS EVERY 4 HOURS PRN
Status: DISCONTINUED | OUTPATIENT
Start: 2024-10-26 | End: 2024-11-01 | Stop reason: HOSPADM

## 2024-10-26 RX ORDER — MAGNESIUM SULFATE HEPTAHYDRATE 40 MG/ML
2 INJECTION, SOLUTION INTRAVENOUS CONTINUOUS
Status: DISCONTINUED | OUTPATIENT
Start: 2024-10-27 | End: 2024-10-27

## 2024-10-26 RX ORDER — OXYTOCIN/0.9 % SODIUM CHLORIDE 30/500 ML
999 PLASTIC BAG, INJECTION (ML) INTRAVENOUS ONCE
Status: DISCONTINUED | OUTPATIENT
Start: 2024-10-26 | End: 2024-10-27 | Stop reason: HOSPADM

## 2024-10-26 RX ORDER — KETOROLAC TROMETHAMINE 30 MG/ML
15 INJECTION, SOLUTION INTRAMUSCULAR; INTRAVENOUS EVERY 6 HOURS
Status: COMPLETED | OUTPATIENT
Start: 2024-10-27 | End: 2024-10-27

## 2024-10-26 RX ORDER — OXYCODONE HYDROCHLORIDE 5 MG/1
5 TABLET ORAL EVERY 4 HOURS PRN
Status: DISPENSED | OUTPATIENT
Start: 2024-10-26 | End: 2024-10-31

## 2024-10-26 RX ORDER — KETOROLAC TROMETHAMINE 30 MG/ML
30 INJECTION, SOLUTION INTRAMUSCULAR; INTRAVENOUS ONCE
Status: COMPLETED | OUTPATIENT
Start: 2024-10-26 | End: 2024-10-26

## 2024-10-26 RX ORDER — DIPHENHYDRAMINE HCL 25 MG
25 CAPSULE ORAL EVERY 4 HOURS PRN
Status: DISCONTINUED | OUTPATIENT
Start: 2024-10-26 | End: 2024-11-01 | Stop reason: HOSPADM

## 2024-10-26 RX ORDER — SODIUM CHLORIDE 0.9 % (FLUSH) 0.9 %
10 SYRINGE (ML) INJECTION EVERY 12 HOURS SCHEDULED
Status: DISCONTINUED | OUTPATIENT
Start: 2024-10-26 | End: 2024-10-26

## 2024-10-26 RX ORDER — OXYTOCIN 10 [USP'U]/ML
INJECTION, SOLUTION INTRAMUSCULAR; INTRAVENOUS AS NEEDED
Status: DISCONTINUED | OUTPATIENT
Start: 2024-10-26 | End: 2024-10-26 | Stop reason: SURG

## 2024-10-26 RX ORDER — IBUPROFEN 600 MG/1
600 TABLET, FILM COATED ORAL EVERY 6 HOURS
Status: DISCONTINUED | OUTPATIENT
Start: 2024-10-28 | End: 2024-11-01 | Stop reason: HOSPADM

## 2024-10-26 RX ORDER — EPHEDRINE SULFATE 5 MG/ML
10 INJECTION INTRAVENOUS
Status: DISCONTINUED | OUTPATIENT
Start: 2024-10-26 | End: 2024-10-26

## 2024-10-26 RX ORDER — METHYLERGONOVINE MALEATE 0.2 MG/ML
200 INJECTION INTRAVENOUS ONCE AS NEEDED
Status: DISCONTINUED | OUTPATIENT
Start: 2024-10-26 | End: 2024-10-27 | Stop reason: HOSPADM

## 2024-10-26 RX ORDER — SODIUM CHLORIDE 9 MG/ML
40 INJECTION, SOLUTION INTRAVENOUS AS NEEDED
Status: DISCONTINUED | OUTPATIENT
Start: 2024-10-26 | End: 2024-10-26

## 2024-10-26 RX ORDER — FENTANYL/BUPIVACAINE/NS/PF 2-1250MCG
PLASTIC BAG, INJECTION (ML) INJECTION CONTINUOUS PRN
Status: DISCONTINUED | OUTPATIENT
Start: 2024-10-26 | End: 2024-10-26 | Stop reason: SURG

## 2024-10-26 RX ORDER — DOCUSATE SODIUM 100 MG/1
100 CAPSULE, LIQUID FILLED ORAL 2 TIMES DAILY PRN
Status: DISCONTINUED | OUTPATIENT
Start: 2024-10-26 | End: 2024-10-27

## 2024-10-26 RX ORDER — CITRIC ACID/SODIUM CITRATE 334-500MG
30 SOLUTION, ORAL ORAL ONCE
Status: COMPLETED | OUTPATIENT
Start: 2024-10-26 | End: 2024-10-26

## 2024-10-26 RX ORDER — OXYCODONE HYDROCHLORIDE 5 MG/1
10 TABLET ORAL EVERY 4 HOURS PRN
Status: DISPENSED | OUTPATIENT
Start: 2024-10-26 | End: 2024-10-31

## 2024-10-26 RX ORDER — ACETAMINOPHEN 500 MG
1000 TABLET ORAL EVERY 6 HOURS
Status: COMPLETED | OUTPATIENT
Start: 2024-10-27 | End: 2024-10-27

## 2024-10-26 RX ORDER — HYDROMORPHONE HYDROCHLORIDE 1 MG/ML
0.5 INJECTION, SOLUTION INTRAMUSCULAR; INTRAVENOUS; SUBCUTANEOUS
Status: DISCONTINUED | OUTPATIENT
Start: 2024-10-26 | End: 2024-11-01 | Stop reason: HOSPADM

## 2024-10-26 RX ORDER — CALCIUM GLUCONATE 94 MG/ML
1 INJECTION, SOLUTION INTRAVENOUS ONCE AS NEEDED
Status: DISCONTINUED | OUTPATIENT
Start: 2024-10-26 | End: 2024-10-27 | Stop reason: HOSPADM

## 2024-10-26 RX ORDER — ONDANSETRON 2 MG/ML
4 INJECTION INTRAMUSCULAR; INTRAVENOUS ONCE AS NEEDED
Status: ACTIVE | OUTPATIENT
Start: 2024-10-26 | End: 2024-10-27

## 2024-10-26 RX ORDER — MISOPROSTOL 200 UG/1
800 TABLET ORAL AS NEEDED
Status: DISCONTINUED | OUTPATIENT
Start: 2024-10-26 | End: 2024-10-27 | Stop reason: HOSPADM

## 2024-10-26 RX ORDER — SODIUM CHLORIDE 0.9 % (FLUSH) 0.9 %
10 SYRINGE (ML) INJECTION AS NEEDED
Status: DISCONTINUED | OUTPATIENT
Start: 2024-10-26 | End: 2024-10-26

## 2024-10-26 RX ORDER — SIMETHICONE 80 MG
80 TABLET,CHEWABLE ORAL 4 TIMES DAILY PRN
Status: DISCONTINUED | OUTPATIENT
Start: 2024-10-26 | End: 2024-11-01 | Stop reason: HOSPADM

## 2024-10-26 RX ORDER — LIDOCAINE HYDROCHLORIDE 10 MG/ML
0.5 INJECTION, SOLUTION EPIDURAL; INFILTRATION; INTRACAUDAL; PERINEURAL ONCE AS NEEDED
Status: DISCONTINUED | OUTPATIENT
Start: 2024-10-26 | End: 2024-10-26

## 2024-10-26 RX ORDER — GUAIFENESIN 600 MG/1
600 TABLET, EXTENDED RELEASE ORAL EVERY 12 HOURS PRN
Status: DISCONTINUED | OUTPATIENT
Start: 2024-10-26 | End: 2024-11-01 | Stop reason: HOSPADM

## 2024-10-26 RX ORDER — OXYTOCIN/0.9 % SODIUM CHLORIDE 30/500 ML
2 PLASTIC BAG, INJECTION (ML) INTRAVENOUS
Status: DISCONTINUED | OUTPATIENT
Start: 2024-10-26 | End: 2024-10-26

## 2024-10-26 RX ADMIN — LABETALOL HYDROCHLORIDE 200 MG: 200 TABLET, FILM COATED ORAL at 23:20

## 2024-10-26 RX ADMIN — Medication 25 MCG: at 08:04

## 2024-10-26 RX ADMIN — SODIUM CHLORIDE, POTASSIUM CHLORIDE, SODIUM LACTATE AND CALCIUM CHLORIDE 75 ML/HR: 600; 310; 30; 20 INJECTION, SOLUTION INTRAVENOUS at 22:42

## 2024-10-26 RX ADMIN — OXYTOCIN 3 UNITS: 10 INJECTION INTRAVENOUS at 20:03

## 2024-10-26 RX ADMIN — KETOROLAC TROMETHAMINE 30 MG: 30 INJECTION, SOLUTION INTRAMUSCULAR at 23:20

## 2024-10-26 RX ADMIN — ACETAMINOPHEN 650 MG: 325 TABLET, FILM COATED ORAL at 00:32

## 2024-10-26 RX ADMIN — MORPHINE SULFATE 0.15 MG: 1 INJECTION, SOLUTION EPIDURAL; INTRATHECAL; INTRAVENOUS at 19:45

## 2024-10-26 RX ADMIN — ACETAMINOPHEN 1000 MG: 500 TABLET, FILM COATED ORAL at 19:23

## 2024-10-26 RX ADMIN — SODIUM CHLORIDE 2.5 MILLION UNITS: 9 INJECTION, SOLUTION INTRAVENOUS at 00:32

## 2024-10-26 RX ADMIN — BUPIVACAINE HYDROCHLORIDE IN DEXTROSE 0.8 ML: 7.5 INJECTION, SOLUTION SUBARACHNOID at 19:45

## 2024-10-26 RX ADMIN — LABETALOL HYDROCHLORIDE 200 MG: 200 TABLET, FILM COATED ORAL at 08:04

## 2024-10-26 RX ADMIN — SODIUM CHLORIDE 2.5 MILLION UNITS: 9 INJECTION, SOLUTION INTRAVENOUS at 04:23

## 2024-10-26 RX ADMIN — BUPIVACAINE HYDROCHLORIDE IN DEXTROSE 0.2 ML: 7.5 INJECTION, SOLUTION SUBARACHNOID at 19:53

## 2024-10-26 RX ADMIN — SODIUM CHLORIDE 250 ML: 9 INJECTION, SOLUTION INTRAVENOUS at 14:37

## 2024-10-26 RX ADMIN — SODIUM CHLORIDE, POTASSIUM CHLORIDE, SODIUM LACTATE AND CALCIUM CHLORIDE 75 ML/HR: 600; 310; 30; 20 INJECTION, SOLUTION INTRAVENOUS at 19:11

## 2024-10-26 RX ADMIN — SODIUM CHLORIDE, POTASSIUM CHLORIDE, SODIUM LACTATE AND CALCIUM CHLORIDE 75 ML/HR: 600; 310; 30; 20 INJECTION, SOLUTION INTRAVENOUS at 04:25

## 2024-10-26 RX ADMIN — SODIUM CHLORIDE 2.5 MILLION UNITS: 9 INJECTION, SOLUTION INTRAVENOUS at 12:17

## 2024-10-26 RX ADMIN — Medication 1 ML/HR: at 09:51

## 2024-10-26 RX ADMIN — Medication 250 ML/HR: at 21:18

## 2024-10-26 RX ADMIN — MAGNESIUM SULFATE HEPTAHYDRATE 2 G/HR: 40 INJECTION, SOLUTION INTRAVENOUS at 08:04

## 2024-10-26 RX ADMIN — SODIUM CHLORIDE 2 G: 900 INJECTION INTRAVENOUS at 19:24

## 2024-10-26 RX ADMIN — Medication 25 MCG: at 04:28

## 2024-10-26 RX ADMIN — MAGNESIUM SULFATE HEPTAHYDRATE 2 G/HR: 40 INJECTION, SOLUTION INTRAVENOUS at 21:24

## 2024-10-26 RX ADMIN — BUPIVACAINE HYDROCHLORIDE IN DEXTROSE 0.3 ML: 7.5 INJECTION, SOLUTION SUBARACHNOID at 20:12

## 2024-10-26 RX ADMIN — Medication 2 MILLI-UNITS/MIN: at 12:17

## 2024-10-26 RX ADMIN — SODIUM CHLORIDE 2.5 MILLION UNITS: 9 INJECTION, SOLUTION INTRAVENOUS at 15:56

## 2024-10-26 RX ADMIN — BUPIVACAINE HYDROCHLORIDE 1 ML: 2.5 INJECTION, SOLUTION EPIDURAL; INFILTRATION; INTRACAUDAL; PERINEURAL at 09:50

## 2024-10-26 RX ADMIN — SODIUM CITRATE AND CITRIC ACID MONOHYDRATE 30 ML: 500; 334 SOLUTION ORAL at 19:24

## 2024-10-26 RX ADMIN — ACETAMINOPHEN 650 MG: 325 TABLET, FILM COATED ORAL at 08:04

## 2024-10-26 RX ADMIN — BETAMETHASONE SODIUM PHOSPHATE AND BETAMETHASONE ACETATE 12 MG: 3; 3 INJECTION, SUSPENSION INTRA-ARTICULAR; INTRALESIONAL; INTRAMUSCULAR at 01:52

## 2024-10-26 RX ADMIN — SODIUM CHLORIDE 2.5 MILLION UNITS: 9 INJECTION, SOLUTION INTRAVENOUS at 08:04

## 2024-10-26 NOTE — ANESTHESIA PROCEDURE NOTES
Labor Epidural    Pre-sedation assessment completed: 10/26/2024 9:32 AM    Patient reassessed immediately prior to procedure    Patient location during procedure: OB  Start Time: 10/26/2024 9:32 AM  Stop Time: 10/26/2024 9:51 AM  Indication:at surgeon's request  Performed By  Anesthesiologist: Chuck Bragg MD  Preanesthetic Checklist  Completed: patient identified, IV checked, site marked, risks and benefits discussed, surgical consent, monitors and equipment checked, pre-op evaluation and timeout performed  Additional Notes  CSF aspirated after catheter placed. Catheter left in place and will be used as spinal catheter  Prep:  Pt Position:sitting  Sterile Tech:cap, gloves, mask and sterile barrier  Prep:chlorhexidine gluconate and isopropyl alcohol  Monitoring:blood pressure monitoring and continuous pulse oximetry  Epidural Block Procedure:  Approach:midline  Guidance:landmark technique and palpation technique  Location:L2-L3  Needle Type:Tuohy  Needle Gauge:17 G  Loss of Resistance Medium: saline  Loss of Resistance: 7cm  Cath Depth at skin:12 cm  Paresthesia: left and transient  Aspiration:positive  Number of Attempts: 1  Post Assessment:  Dressing:occlusive dressing applied and secured with tape  Pt Tolerance:patient tolerated the procedure well with no apparent complications  Complications:yes

## 2024-10-26 NOTE — PROGRESS NOTES
" [facility]  Obstetric Progress Note    Subjective   Pt without complaint.  Bps mostly mild range Max BP was 176/97 1045 am on 10/25 but have been mild since that time.     Objective     Vitals:  Vitals:    10/26/24 0700 10/26/24 0800 10/26/24 0830 10/26/24 0900   BP: 151/92 130/79 121/63 153/87   BP Location:       Patient Position:       Pulse: 85 70 72 74   Resp:       Temp:  97.9 °F (36.6 °C)     TempSrc:       SpO2: 94%  95%    Weight:       Height:         Flowsheet Rows      Flowsheet Row First Filed Value   Admission Height 172.7 cm (68\") Documented at 10/25/2024 0815   Admission Weight 131 kg (289 lb 7.4 oz) Documented at 10/25/2024 0815            Intake/Output Summary (Last 24 hours) at 10/26/2024 0947  Last data filed at 10/26/2024 0900  Gross per 24 hour   Intake 4024 ml   Output 1550 ml   Net 2474 ml       Fetal Heart Rate Assessment:   Category 1  [place]:  q 3    Physical Exam:  General: Patient is in no acute distress    Pelvic Exam: was checked (by me): 2 cm / 50% % / -2, AROM- Clear, and IUPC placed without difficulty   CRB removed without difficulty.          Assessment & Plan     Principal Problem:    Preeclampsia         Assessment:  1.  Intrauterine pregnancy at 33w4d gestation.    2.  Induction of labor due to preeclamspia with severe features.   3.  GBS status: Unknown    Plan:  1. Pitocin induction., PCN for GBS. , and has rec'd BMZ and NICU consult.  Continue Magnesium seizure prophylaxis.   2. Plan of care has been reviewed with patient.  3.  Risks, benefits of treatment plan have been discussed.  4.  All questions have been answered.        Ashli Frances MD  10/26/2024  09:47 EDT    "

## 2024-10-26 NOTE — PROGRESS NOTES
" [facility]  Obstetric Progress Note    Subjective   Comfortable c epidural.     Objective     Vitals:  Vitals:    10/26/24 1100 10/26/24 1200 10/26/24 1217 10/26/24 1300   BP: 123/79 133/77 132/75 122/67   BP Location:       Patient Position:       Pulse: 72 70 70 72   Resp:       Temp:       TempSrc:       SpO2: 91% 95%     Weight:       Height:         Flowsheet Rows      Flowsheet Row First Filed Value   Admission Height 172.7 cm (68\") Documented at 10/25/2024 0815   Admission Weight 131 kg (289 lb 7.4 oz) Documented at 10/25/2024 0815            Intake/Output Summary (Last 24 hours) at 10/26/2024 1313  Last data filed at 10/26/2024 0900  Gross per 24 hour   Intake 4024 ml   Output 1550 ml   Net 2474 ml       Fetal Heart Rate Assessment:   Category 1, minimal variability  [place]:  q 3    Physical Exam:  General: Patient is in no acute distress    Pelvic Exam: was checked (by me): 3 cm / 50% % / -2            Assessment & Plan     Principal Problem:    Preeclampsia         Assessment:  1.  Intrauterine pregnancy at 33w4d gestation.    2.  Induction of labor due to severe preE  3.  GBS status: Unknown    Plan:  1. Pitocin induction. and PCN for GBS.  Continue magnesium seizure prophylaxis.   2. Plan of care has been reviewed with patient.  3.  Risks, benefits of treatment plan have been discussed.  4.  All questions have been answered.        Ashli Frances MD  10/26/2024  13:13 EDT    "

## 2024-10-26 NOTE — PROGRESS NOTES
Pt comfortable c epidural.   FHTs have been persistently category 2  Some episodes of late decelerations and some episodes with minimal variability and no decelerations.    SVE /-2  Discussed c pt that fetal station is still high and baby did not have any improvement in tracing with amnioinfusion or many position changes.  Contractions are not adequate and when they are more strong/ consistent there are more late decelerations.    I discussed c pt and her  that as she is remote from delivery with persistent episodes of decelerations in the setting of a 33 wk baby with magnesium my concern is not wanting to unduly stress the baby in addition to the stress of prematurity and my concern for the baby to tolerate a prolonged labor and or pushing if needed.    I also discussed with them the risks of  delivery to mom and baby and the benefits of continued IOL and the risks of continued IOL.  Pt given the option of proceeding c C/S now versus continued trial of IOL.   Pt and  were given time to ask questions and discuss and they would like to proceed c a  delivery at this time.    C/S edu done.  Will proceed.

## 2024-10-26 NOTE — ANESTHESIA PREPROCEDURE EVALUATION
Anesthesia Evaluation     NPO Solid Status: > 8 hours  NPO Liquid Status: > 8 hours           Airway   Mallampati: II  TM distance: >3 FB  Neck ROM: full  No difficulty expected  Dental - normal exam     Pulmonary - normal exam   (+) ,recent URI  Cardiovascular - normal exam    (+) hypertension      Neuro/Psych  GI/Hepatic/Renal/Endo      Musculoskeletal     Abdominal  - normal exam    Bowel sounds: normal.   Substance History      OB/GYN    (+) Pregnant, Preeclampsia, pregnancy induced hypertension        Other                    Anesthesia Plan    ASA 3     epidural       Anesthetic plan, risks, benefits, and alternatives have been provided, discussed and informed consent has been obtained with: patient.  Pre-procedure education provided    CODE STATUS:    Level Of Support Discussed With: Patient  Code Status (Patient has no pulse and is not breathing): CPR (Attempt to Resuscitate)  Medical Interventions (Patient has pulse or is breathing): Full Support

## 2024-10-27 LAB
ABO GROUP BLD: NORMAL
BASOPHILS # BLD AUTO: 0.05 10*3/MM3 (ref 0–0.2)
BASOPHILS NFR BLD AUTO: 0.3 % (ref 0–1.5)
BLD GP AB SCN SERPL QL: NEGATIVE
DEPRECATED RDW RBC AUTO: 48.4 FL (ref 37–54)
EOSINOPHIL # BLD AUTO: 0.02 10*3/MM3 (ref 0–0.4)
EOSINOPHIL NFR BLD AUTO: 0.1 % (ref 0.3–6.2)
ERYTHROCYTE [DISTWIDTH] IN BLOOD BY AUTOMATED COUNT: 14.2 % (ref 12.3–15.4)
HCT VFR BLD AUTO: 34.3 % (ref 34–46.6)
HGB BLD-MCNC: 11.7 G/DL (ref 12–15.9)
IMM GRANULOCYTES # BLD AUTO: 0.27 10*3/MM3 (ref 0–0.05)
IMM GRANULOCYTES NFR BLD AUTO: 1.4 % (ref 0–0.5)
LYMPHOCYTES # BLD AUTO: 1.42 10*3/MM3 (ref 0.7–3.1)
LYMPHOCYTES NFR BLD AUTO: 7.4 % (ref 19.6–45.3)
MCH RBC QN AUTO: 32.5 PG (ref 26.6–33)
MCHC RBC AUTO-ENTMCNC: 34.1 G/DL (ref 31.5–35.7)
MCV RBC AUTO: 95.3 FL (ref 79–97)
MONOCYTES # BLD AUTO: 1.1 10*3/MM3 (ref 0.1–0.9)
MONOCYTES NFR BLD AUTO: 5.8 % (ref 5–12)
NEUTROPHILS NFR BLD AUTO: 16.25 10*3/MM3 (ref 1.7–7)
NEUTROPHILS NFR BLD AUTO: 85 % (ref 42.7–76)
NRBC BLD AUTO-RTO: 0 /100 WBC (ref 0–0.2)
PLATELET # BLD AUTO: 215 10*3/MM3 (ref 140–450)
PMV BLD AUTO: 9 FL (ref 6–12)
RBC # BLD AUTO: 3.6 10*6/MM3 (ref 3.77–5.28)
RH BLD: POSITIVE
T&S EXPIRATION DATE: NORMAL
WBC NRBC COR # BLD AUTO: 19.11 10*3/MM3 (ref 3.4–10.8)

## 2024-10-27 PROCEDURE — 25010000002 KETOROLAC TROMETHAMINE PER 15 MG: Performed by: OBSTETRICS & GYNECOLOGY

## 2024-10-27 PROCEDURE — 25010000002 MAGNESIUM SULFATE 20 GM/500ML SOLUTION: Performed by: OBSTETRICS & GYNECOLOGY

## 2024-10-27 PROCEDURE — 86900 BLOOD TYPING SEROLOGIC ABO: CPT | Performed by: OBSTETRICS & GYNECOLOGY

## 2024-10-27 PROCEDURE — 86901 BLOOD TYPING SEROLOGIC RH(D): CPT | Performed by: OBSTETRICS & GYNECOLOGY

## 2024-10-27 PROCEDURE — 85025 COMPLETE CBC W/AUTO DIFF WBC: CPT | Performed by: OBSTETRICS & GYNECOLOGY

## 2024-10-27 PROCEDURE — 86850 RBC ANTIBODY SCREEN: CPT | Performed by: OBSTETRICS & GYNECOLOGY

## 2024-10-27 RX ORDER — DOCUSATE SODIUM 100 MG/1
100 CAPSULE, LIQUID FILLED ORAL 2 TIMES DAILY
Status: DISCONTINUED | OUTPATIENT
Start: 2024-10-27 | End: 2024-11-01 | Stop reason: HOSPADM

## 2024-10-27 RX ORDER — PRENATAL VIT/IRON FUM/FOLIC AC 27MG-0.8MG
1 TABLET ORAL NIGHTLY
Status: DISCONTINUED | OUTPATIENT
Start: 2024-10-27 | End: 2024-11-01 | Stop reason: HOSPADM

## 2024-10-27 RX ORDER — PRENATAL VIT/IRON FUM/FOLIC AC 27MG-0.8MG
1 TABLET ORAL NIGHTLY
Status: DISCONTINUED | OUTPATIENT
Start: 2024-10-28 | End: 2024-10-27

## 2024-10-27 RX ADMIN — ACETAMINOPHEN 1000 MG: 500 TABLET, FILM COATED ORAL at 19:45

## 2024-10-27 RX ADMIN — KETOROLAC TROMETHAMINE 15 MG: 30 INJECTION, SOLUTION INTRAMUSCULAR at 17:05

## 2024-10-27 RX ADMIN — LABETALOL HYDROCHLORIDE 200 MG: 200 TABLET, FILM COATED ORAL at 20:25

## 2024-10-27 RX ADMIN — ACETAMINOPHEN 1000 MG: 500 TABLET, FILM COATED ORAL at 14:53

## 2024-10-27 RX ADMIN — DOCUSATE SODIUM 100 MG: 100 CAPSULE, LIQUID FILLED ORAL at 19:45

## 2024-10-27 RX ADMIN — ACETAMINOPHEN 1000 MG: 500 TABLET, FILM COATED ORAL at 09:15

## 2024-10-27 RX ADMIN — SIMETHICONE 80 MG: 80 TABLET, CHEWABLE ORAL at 20:25

## 2024-10-27 RX ADMIN — MAGNESIUM SULFATE HEPTAHYDRATE 2 G/HR: 40 INJECTION, SOLUTION INTRAVENOUS at 09:15

## 2024-10-27 RX ADMIN — LABETALOL HYDROCHLORIDE 200 MG: 200 TABLET, FILM COATED ORAL at 09:15

## 2024-10-27 RX ADMIN — ACETAMINOPHEN 1000 MG: 500 TABLET, FILM COATED ORAL at 02:13

## 2024-10-27 RX ADMIN — KETOROLAC TROMETHAMINE 15 MG: 30 INJECTION, SOLUTION INTRAMUSCULAR at 05:04

## 2024-10-27 RX ADMIN — KETOROLAC TROMETHAMINE 15 MG: 30 INJECTION, SOLUTION INTRAMUSCULAR at 11:46

## 2024-10-27 RX ADMIN — PRENATAL VITAMINS-IRON FUMARATE 27 MG IRON-FOLIC ACID 0.8 MG TABLET 1 TABLET: at 20:25

## 2024-10-27 RX ADMIN — KETOROLAC TROMETHAMINE 15 MG: 30 INJECTION, SOLUTION INTRAMUSCULAR at 23:41

## 2024-10-27 NOTE — LACTATION NOTE
Provided mother with nipple cream and gel pads, instructed on use. Basic teaching done. Denies history of breast surgery. States that she takes prenatal vitamins, vitamin C and vitamin D routinely. Denies wool allergy. Plans to apply for WIC in [facility]. Encouraged to call insurance company tomorrow to inquire about breastpump coverage. Breasts noted to be widely spaced. Discussed with mother about pumping Q3hrs P70bgat and prn. Used [facility] double pump, using 27mm flanges. Pumped with mother. Used absorbant swabs to collect drops of milk to take to baby. Mother's mother and sister present. Praised efforts. Encouraged to call as needed.

## 2024-10-27 NOTE — PROGRESS NOTES
CAMILLA Oliveros  Postpartum Note    Subjective   Postpartum Day 1:  Primary Low Transverse  Section    Patient without complaints. Her pain is well controlled with nonsteroidal anti-inflammatory drugs. She is ambulating and voiding well.  Bleeding is appropriate for pp period.  Bps have been 120//80s.    Objective     Vitals:  Vitals:    10/27/24 0730 10/27/24 0800 10/27/24 0830 10/27/24 0900   BP: 121/70 115/73 126/72 133/82   BP Location:       Patient Position:       Pulse: 58 58 66 65   Resp:       Temp:    97.8 °F (36.6 °C)   TempSrc:       SpO2: 95% 95% 94% 97%   Weight:       Height:         UOP 920cc/4hrs     Physical Exam:  General:  no acute distress.  Abdomen: Fundus firm below umbilicus, nontender and bandage in place  Extremities: moves all extremities well, no cyanosis or erythema, 1+ edema      Labs:  Results from last 7 days   Lab Units 10/27/24  0340 10/26/24  0151 10/25/24  1713   WBC 10*3/mm3 19.11* 13.67* 12.48*   HEMOGLOBIN g/dL 11.7* 13.6 13.4   HEMATOCRIT % 34.3 40.6 38.7   PLATELETS 10*3/mm3 215 238 218     Results from last 7 days   Lab Units 10/26/24  0151   GLUCOSE mg/dL 114*          Feeding method: Breastfeeding Status: Unknown     Blood Type: RH Positive        Assessment & Plan     Principal Problem:    Preeclampsia  Active Problems:     delivery delivered      Lynn Soto is Day 1  post-partum from a  Primary Low Transverse  Section      Plan:  Continue current care  Will continue Magnesium for 24hrs pp.      Ashli Frances MD  10/27/2024  09:47 EDT

## 2024-10-27 NOTE — PLAN OF CARE
Goal Outcome Evaluation:           Progress: improving  Outcome Evaluation: Pt delivered viable female infant by  section last night at 2002 for fetal intolerance of labor. Pt remains on MgSO4 for preeclampsia, planning for 24 hours after delivery per MD. Pt's output is adeuate, fundus and lochia WNL, VSS. Pt's pain is controlled with scheduled medications. Pt resting between care this shift. Pt did sit on edge of bed with RN but was too dizzy to stand. Infant is in NICU, pt plans to visit later this morning when able to get up and not feeling dizzy. Pt's spouse and mother remain at bedside tonight.

## 2024-10-27 NOTE — L&D DELIVERY NOTE
[facility]   Section Operative Note    Pre-Operative Dx:   1.  Patient is a 23 y.o. female  currently at 33w4d, who presents with induction of labor for severe preeclampsia.    2. Fetal intolerance to labor      Postoperative dx:    1.  Same     Procedure: Primary LTCS     Surgeon:    Assistant:                       Ashli Frances MD    None       Anesthesia:  Anesthesiologist:  Epidural  Dr. Bragg     EBL:  800cc     Antibiotics: cefazolin (Ancef)     Infant    Findings: VFI     Apgars: pending       Procedure Details:     Pt was taken to the OR where she was prepped and draped in the usual sterile fashion with a catheter and a left tilt.  Anesthesia was found to be adequate.    A Pfannenstiel skin incision was made with the scalpel and carried through to the underlying layer of fascia with the scalpel.  The fascial incision was extended laterally with the Chaudhari scissors and  from the underlying rectus muscles superiorly.  The rectus muscles were  in the midline and the peritoneum was entered sharply with the Chaudhari scissors.  The peritoneal incision was extended laterally.  The Eliu retractor was placed.   A low transverse uterine incision was made with the scalpel and extended in a cephalocaudad manner manually.    The infant's head, shoulders, and body were delivered without difficulty.  Nose and mouth were bulb suctioned and infant handed to awaiting nurse with good cry, color, tone, and movement of all extremities.    Placenta was delivered spontaneously intact with a three vessel cord.    The uterus was exteriorized and cleared of all clots and debris.    The uterine incision was repaired with 0 Monocryl in a running, locked fashion and excellent hemostasis was achieved.    The uterus was returned to the abdomen, the gutters were cleared of all clots and debris.  The uterine incision was examined and hemostatic in situ.     The peritoneum was reapproximated with 3.0  Monocryl in a running fashion.  The rectus muscles were reapproximated with 0 Monocryl in several simple interrupted sutures.    The fascia was closed with 0 Vicryl in a running, locked fashion.    The subcutaneous fat was irrigated and closed with 3.0 Monocryl.    The skin was closed with 3.0 Vicryl in a running  fashion.  Sponge, lap, and needle counts were correct x 2.        Complications:   None     Disposition  Mother to Mother Baby/Postpartum  in stable condition currently.  Baby to NICU  in stable condition currently.                  Ashli Frances MD  10/26/2024  20:34 EDT

## 2024-10-28 PROCEDURE — 97161 PT EVAL LOW COMPLEX 20 MIN: CPT | Performed by: PHYSICAL THERAPIST

## 2024-10-28 RX ADMIN — ACETAMINOPHEN 650 MG: 325 TABLET, FILM COATED ORAL at 14:21

## 2024-10-28 RX ADMIN — PRENATAL VITAMINS-IRON FUMARATE 27 MG IRON-FOLIC ACID 0.8 MG TABLET 1 TABLET: at 21:04

## 2024-10-28 RX ADMIN — ACETAMINOPHEN 650 MG: 325 TABLET, FILM COATED ORAL at 20:52

## 2024-10-28 RX ADMIN — IBUPROFEN 600 MG: 600 TABLET, FILM COATED ORAL at 17:14

## 2024-10-28 RX ADMIN — SIMETHICONE 80 MG: 80 TABLET, CHEWABLE ORAL at 08:11

## 2024-10-28 RX ADMIN — LABETALOL HYDROCHLORIDE 200 MG: 200 TABLET, FILM COATED ORAL at 08:11

## 2024-10-28 RX ADMIN — IBUPROFEN 600 MG: 600 TABLET, FILM COATED ORAL at 11:40

## 2024-10-28 RX ADMIN — DOCUSATE SODIUM 100 MG: 100 CAPSULE, LIQUID FILLED ORAL at 20:52

## 2024-10-28 RX ADMIN — IBUPROFEN 600 MG: 600 TABLET, FILM COATED ORAL at 05:16

## 2024-10-28 RX ADMIN — ACETAMINOPHEN 650 MG: 325 TABLET, FILM COATED ORAL at 08:11

## 2024-10-28 RX ADMIN — OXYCODONE 5 MG: 5 TABLET ORAL at 11:44

## 2024-10-28 RX ADMIN — LABETALOL HYDROCHLORIDE 200 MG: 200 TABLET, FILM COATED ORAL at 21:04

## 2024-10-28 RX ADMIN — ACETAMINOPHEN 650 MG: 325 TABLET, FILM COATED ORAL at 02:16

## 2024-10-28 NOTE — PAYOR COMM NOTE
"Delivery Record:    Delivery date and time: 10/26/24 @ 2002  Gestational age:   33+4  weeks.  /Para/Ab: 3/1/2  Sex: female  Birth weight: 1670 grams  Birth length: 16.81  inches  Apgars:7/9  Delivery type:            Nery Guajardo (23 y.o. Female)       Date of Birth   2000    Social Security Number       Address   [address] [place] [address] [place] [address]    Home Phone       MRN   [id number]       Cheondoism   None    Marital Status                               Admission Date   10/25/24    Admission Type   Elective    Admitting Provider   Jessenia Le MD    Attending Provider   Jessenia Le MD    Department, Room/Bed   [facility] MOTHER BABY, M408/1       Discharge Date       Discharge Disposition       Discharge Destination                                 Attending Provider: Jessenia Le MD    Allergies: No Known Allergies    Isolation: None   Infection: None   Code Status: CPR    Ht: 172.7 cm (68\")   Wt: 131 kg (288 lb)    Admission Cmt: None   Principal Problem: Preeclampsia [O14.90]                   Active Insurance as of 10/25/2024       Primary Coverage       Payor Plan Insurance Group Employer/Plan Group    ANTHEM BLUE CROSS ANTHEM BLUE CROSS BLUE SHIELD PPO [id number]       Payor Plan Address Payor Plan Phone Number Payor Plan Fax Number Effective Dates    [address] [phone]  2024 - None Entered    [facility] [address]         Subscriber Name Subscriber Birth Date Member ID       NERY GUAJARDO 2000 [id number]               Secondary Coverage       Payor Plan Insurance Group Employer/Plan Group    [id number]       Payor Plan Address Payor Plan Phone Number Payor Plan Fax Number Effective Dates    MAIL STOP:   2023 - None Entered    [address] [address]       [facility] [address]         Subscriber Name Subscriber Birth Date Member ID       NERY GUAJARDO 2000 [id number]          "            Emergency Contacts        (Rel.) Home Phone Work Phone Mobile Phone    Jeremy Soto (Spouse) -- -- [phone]    catrina shelby (Mother) -- -- [phone]                 History & Physical        Jessenia Le MD at 10/25/24 0964          [facility]  Obstetric History and Physical     Chief Complaint: severe range blood pressures and new onset right upper quadrant pain, with known preeclampsia    Subjective    Patient is a 23 y.o. female  currently at 33w3d, who presents with new onset right upper quadrant pain that has worsened since yesterday evening. She reports her pain did not improve despite medical therapy with tylenol at home. She denies vision changes, but she does report intermittent headache. She denies palpitations, worsening dyspnea, or SOA. She also denies worsening LE swelling.   Patient has been on labetalol 200mg BID. She has not taken AM dose of medical therapy. She has been on this medical therapy for preeclampsia without severe features.   During observation, she had severe range Bps with systolic BP as high as 170s, and this did not improve despite patient receiving her scheduled antihypertensive medical therapy. She did have to receive a dose of procardia for acute hypertensive crisis while being observed.     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       Prenatal Information:  Prenatal Results       Initial Prenatal Labs       Test Value Reference Range Date Time    Hemoglobin  13.2 g/dL 12.0 - 15.9 24      ^ 13.0 g/dL 12.0 - 16.0 05/10/24 0746       13.3 g/dL 12.0 - 15.9 04/10/24 0526    Hematocrit  38.7 % 34.0 - 46.6 24      ^ 37.7  % 36.0 - 46.0 05/10/24 0746       40.6 % 34.0 - 46.6 04/10/24 0526    Platelets  270 10*3/mm3 140 - 450 05/13/24 2202      ^ 233 10*3/uL 140 - 440 05/10/24 0746       269 10*3/mm3 140 - 450 04/10/24 0526    Rubella IgG        Hepatitis B SAg        Hepatitis C Ab        RPR        T. Pallidum Ab         ABO  O   05/13/24 2202    Rh  Positive   05/13/24 2202    Antibody Screen        HIV        Urine Culture        Gonorrhea  Not Detected  Not Detected 05/13/24 2154    Chlamydia  Not Detected  Not Detected, Invalid 05/13/24 2154    TSH        HgB A1c         Varicella IgG        Hemoglobinopathy Fractionation        Hemoglobinopathy (genetic testing)        Cystic fibrosis                   Fetal testing        Test Value Reference Range Date Time    NIPT        MSAFP        AFP-4                  2nd and 3rd Trimester       Test Value Reference Range Date Time    Hemoglobin (repeated)  12.1 g/dL 12.0 - 15.9 10/25/24 0940       12.4 g/dL 12.0 - 15.9 10/15/24 1255    Hematocrit (repeated)  36.5 % 34.0 - 46.6 10/25/24 0940       36.4 % 34.0 - 46.6 10/15/24 1255    Platelets   220 10*3/mm3 140 - 450 10/25/24 0940       168 10*3/mm3 140 - 450 10/15/24 1255       270 10*3/mm3 140 - 450 05/13/24 2202      ^ 233 10*3/uL 140 - 440 05/10/24 0746       269 10*3/mm3 140 - 450 04/10/24 0526    1 hour GTT         Antibody Screen (repeated)        3rd TM syphilis scrn (repeated)  RPR         3rd TM syphilis scrn (repeated) TP-Ab        3rd TM syphilis screen TB-Ab (FTA)        Syphilis cascade test TP-Ab (EIA)        Syphilis cascade TPPA        GTT Fasting        GTT 1 Hr        GTT 2 Hr        GTT 3 Hr        Group B Strep                  Other testing        Test Value Reference Range Date Time    Parvo IgG         CMV IgG                   Drug Screening       Test Value Reference Range Date Time    Amphetamine Screen        Barbiturate Screen        Benzodiazepine Screen        Methadone Screen        Phencyclidine Screen         Opiates Screen        THC Screen        Cocaine Screen        Propoxyphene Screen        Buprenorphine Screen        Methamphetamine Screen        Oxycodone Screen        Tricyclic Antidepressants Screen                  Legend    ^: Historical                          External Prenatal Results       Pregnancy Outside Results - Transcribed From Office Records - See Scanned Records For Details       Test Value Date Time    ABO  O  05/13/24 2202    Rh  Positive  05/13/24 2202    Antibody Screen       Varicella IgG       Rubella       Hgb  12.1 g/dL 10/25/24 0940       12.4 g/dL 10/15/24 1255       13.2 g/dL 05/13/24 2202      ^ 13.0 g/dL 05/10/24 0746       13.3 g/dL 04/10/24 0526    Hct  36.5 % 10/25/24 0940       36.4 % 10/15/24 1255       38.7 % 05/13/24 2202      ^ 37.7 % 05/10/24 0746       40.6 % 04/10/24 0526    HgB A1c        1h GTT       3h GTT Fasting       3h GTT 1 hour       3h GTT 2 hour       3h GTT 3 hour        Gonorrhea (discrete)  Not Detected  05/13/24 2154    Chlamydia (discrete)  Not Detected  05/13/24 2154    RPR       Syphils cascade: TP-Ab (FTA)       TP-Ab       TP-Ab (EIA)       TPPA       HBsAg       Herpes Simplex Virus PCR       Herpes Simplex VIrus Culture       HIV       Hep C RNA Quant PCR       Hep C Antibody       AFP       NIPT       Cystic Fibroisis        Group B Strep       GBS Susceptibility to Clindamycin       GBS Susceptibility to Erythromycin       Fetal Fibronectin       Genetic Testing, Maternal Blood                 Drug Screening       Test Value Date Time    Urine Drug Screen       Amphetamine Screen       Barbiturate Screen       Benzodiazepine Screen       Methadone Screen       Phencyclidine Screen       Opiates Screen       THC Screen       Cocaine Screen       Propoxyphene Screen       Buprenorphine Screen       Methamphetamine Screen       Oxycodone Screen       Tricyclic Antidepressants Screen                 Legend    ^: Historical                              Past OB History: recurrent pregnancy loss        Past Medical History: History reviewed. No pertinent past medical history.  Anxiety    Past Surgical History Past Surgical History:   Procedure Laterality Date    D & C WITH SUCTION N/A 1/11/2024    Procedure: DILATATION AND CURETTAGE WITH SUCTION;  Surgeon: Jessenia Le MD;  Location: [facility] MAIN OR;  Service: Obstetrics/Gynecology;  Laterality: N/A;    MOUTH SURGERY      TONSILLECTOMY          Family History: Family History   Problem Relation Age of Onset    Heart disease Maternal Grandfather       Social History:  reports that she has never smoked. She has never been exposed to tobacco smoke. She has never used smokeless tobacco.   reports no history of alcohol use.   reports no history of drug use.        General ROS: Pertinent items are noted in HPI    Objective     Vitals:     Vitals:    10/25/24 1219 10/25/24 1224 10/25/24 1227 10/25/24 1231   BP:  167/80 167/80 162/81   BP Location:       Patient Position:       Pulse: 59 68 59 55   Resp:       Temp:       TempSrc:       SpO2: 97%      Weight:       Height:           Fetal Heart Rate Assessment:   Cat I tracing    [place]:   Quiet      Physical Exam:     General Appearance:    Alert, cooperative, in no acute distress   Lungs:     Clear to auscultation,respirations regular.    Heart:    Regular rhythm and normal rate.   Breast Exam:    Deferred   Abdomen:     Normal bowel sounds, no masses, soft non-tender,         non-distended, no guarding, no rebound tenderness   Pelvic Exam:    Ft/50/-2    Presentation: vertex confirmed on US    Fetal weight: 4#4.5oz, 1941 grams   Extremities:   Moves all extremities well, trace edema, no cyanosis, no              redness   Skin:   No bleeding, bruising or rash   Neurologic:   No focal neurologic defect          Laboratory Results:   Lab Results (last 48 hours)       Procedure Component Value Units Date/Time    Lactate Dehydrogenase [[id number]]  (Abnormal) Collected:  10/25/24 0940    Specimen: Blood Updated: 10/25/24 1056      U/L     Comprehensive Metabolic Panel [[id number]]  (Abnormal) Collected: 10/25/24 0940    Specimen: Blood Updated: 10/25/24 1041     Glucose 71 mg/dL      BUN 14 mg/dL      Creatinine 0.68 mg/dL      Sodium 138 mmol/L      Potassium 4.3 mmol/L      Chloride 107 mmol/L      CO2 20.6 mmol/L      Calcium 8.9 mg/dL      Total Protein 5.9 g/dL      Albumin 3.1 g/dL      ALT (SGPT) 43 U/L      AST (SGOT) 45 U/L      Alkaline Phosphatase 135 U/L      Total Bilirubin 0.2 mg/dL      Globulin 2.8 gm/dL      A/G Ratio 1.1 g/dL      BUN/Creatinine Ratio 20.6     Anion Gap 10.4 mmol/L      eGFR 125.7 mL/min/1.73     Narrative:      GFR Normal >60  Chronic Kidney Disease <60  Kidney Failure <15      CBC & Differential [[id number]]  (Abnormal) Collected: 10/25/24 0940    Specimen: Blood Updated: 10/25/24 1023    Narrative:      The following orders were created for panel order CBC & Differential.  Procedure                               Abnormality         Status                     ---------                               -----------         ------                     CBC Auto Differential[[id number]]        Abnormal            Final result                 Please view results for these tests on the individual orders.    CBC Auto Differential [[id number]]  (Abnormal) Collected: 10/25/24 0940    Specimen: Blood Updated: 10/25/24 1023     WBC 10.80 10*3/mm3      RBC 3.85 10*6/mm3      Hemoglobin 12.1 g/dL      Hematocrit 36.5 %      MCV 94.8 fL      MCH 31.4 pg      MCHC 33.2 g/dL      RDW 14.0 %      RDW-SD 46.7 fl      MPV 9.1 fL      Platelets 220 10*3/mm3      Neutrophil % 70.0 %      Lymphocyte % 16.7 %      Monocyte % 7.6 %      Eosinophil % 2.0 %      Basophil % 0.6 %      Immature Grans % 3.1 %      Neutrophils, Absolute 7.57 10*3/mm3      Lymphocytes, Absolute 1.80 10*3/mm3      Monocytes, Absolute 0.82 10*3/mm3      Eosinophils, Absolute 0.22 10*3/mm3       Basophils, Absolute 0.06 10*3/mm3      Immature Grans, Absolute 0.33 10*3/mm3      nRBC 0.2 /100 WBC             Other Studies:      Assessment & Plan    Active Problems:    * No active hospital problems. *         Assessment:  1.  Intrauterine pregnancy at 33w3d gestation with reactive fetal status.    2.  Severe preeclampsia with severe features - severe range Bps, and elevated LFTs  3.  Obstetrical history significant for is remarkable for obesity, abnormal Qnatal testing see above  4.  GBS status: Unknown, collected on 10/24 in office and result pending, PPX PCN started     Plan:  1. Patient presented with RUQ pain, sustained severe range Bps, and elevated LFTs with known preeclampsia diagnosed at 32 weeks.   Patient admitted for delivery planning and IOL. Liver US ordered and shows no evidence of lesions and appears normal, kidneys appear normal, normal appearing gallbladder.   NICU consultation on admission to discuss expectations post delivery.   BMZ for fetal lung maturity ordered.   IV magnesium sulfate ordered for seizure PPX.   PCN ordered for GBS unknown due to  induction.   Per ACOG guidelines recommendation for delivery for severe preeclampsia at 34 weeks of gestation or if signs of worsening severe symptoms.   MFM consultation on admission with Dr. Ashley Dickerson who recommends proceeding with IOL at this time due to sustained SR Bps and elevation in LFTs with RUQ pain. Due to patient not having a vaginal delivery previously and with high likelihood of having multi-day induction, she recommends proceeding with cervical ripening at this time while patient receives BMZ therapy. Dr. Dickerson also recommends completing BMZ in 12 hours and not 24 hours. Also will perform routine labs Q6 hours to monitor disease process with low threshold to expedite delivery with  section if change in patient status or concern for rapid disease progression. Following discussion with maternal fetal medicine  specialist, [facility] and also patient, and family, the plan was to proceed forward with above plan.   ''  IOL:   Patient received CRB at 1940 without any difficulty.   I recommend starting cytotec dosing around 0400 for dual therapy following second dose of BMZ.     2. Plan of care has been reviewed with patient.  3.  Risks, benefits of treatment plan have been discussed.  4.  All questions have been answered.  5. Severe preeclampsia: severe features being severe range Bps requiring antihypertensive therapy with procardia PO 20mg x 1 and elevated LFTs. IV magnesium sulfate ordered for seizure PPX. Continue PO labetalol 200mg BID.   6.  Qnatal with microdeletion, renal pelvis dilation that had resolved on last US with MFM 2024, patient has received extensive counseling on MFM on findings such as association with microdeletion such as hypotonia, seizures,  intelluctual disability, and brain abnormalities. MFM counseling completed this pregnancy and patient declined amniocentesis.                 Jessenia Le MD  10/25/24  09:58 EDT    Electronically signed by Jessenia Le MD at 10/25/24 2011       H&P signed by [employer], [place] at 10/28/24 0813         [Media Unavailable] Scan on 10/28/2024 0805 by [employer], [employer]: OB [facility]ATAL H&P[facility], 2024-10/15/2024          Electronically signed by [employer], [place] at 10/28/24 0813          Operative/Procedure Notes (last 4 days)        Ashli Frances MD at 10/26/24 2034          [facility]   Section Operative Note    Pre-Operative Dx:   1.  Patient is a 23 y.o. female  currently at 33w4d, who presents with induction of labor for severe preeclampsia.    2. Fetal intolerance to labor      Postoperative dx:    1.  Same     Procedure: Primary LTCS     Surgeon:    Assistant:                       Ashli Frances MD    None       Anesthesia:  Anesthesiologist:  Epidural  Dr. Bragg     EBL:  800cc     Antibiotics: cefazolin (Ancef)      Infant    Findings: VFI     Apgars: pending       Procedure Details:     Pt was taken to the OR where she was prepped and draped in the usual sterile fashion with a catheter and a left tilt.  Anesthesia was found to be adequate.    A Pfannenstiel skin incision was made with the scalpel and carried through to the underlying layer of fascia with the scalpel.  The fascial incision was extended laterally with the Chaudhari scissors and  from the underlying rectus muscles superiorly.  The rectus muscles were  in the midline and the peritoneum was entered sharply with the Chaudhari scissors.  The peritoneal incision was extended laterally.  The Eliu retractor was placed.   A low transverse uterine incision was made with the scalpel and extended in a cephalocaudad manner manually.    The infant's head, shoulders, and body were delivered without difficulty.  Nose and mouth were bulb suctioned and infant handed to awaiting nurse with good cry, color, tone, and movement of all extremities.    Placenta was delivered spontaneously intact with a three vessel cord.    The uterus was exteriorized and cleared of all clots and debris.    The uterine incision was repaired with 0 Monocryl in a running, locked fashion and excellent hemostasis was achieved.    The uterus was returned to the abdomen, the gutters were cleared of all clots and debris.  The uterine incision was examined and hemostatic in situ.     The peritoneum was reapproximated with 3.0 Monocryl in a running fashion.  The rectus muscles were reapproximated with 0 Monocryl in several simple interrupted sutures.    The fascia was closed with 0 Vicryl in a running, locked fashion.    The subcutaneous fat was irrigated and closed with 3.0 Monocryl.    The skin was closed with 3.0 Vicryl in a running  fashion.  Sponge, lap, and needle counts were correct x 2.        Complications:   None     Disposition  Mother to Mother Baby/Postpartum  in stable condition  currently.  Baby to NICU  in stable condition currently.                  Ashli Frances MD  10/26/2024  20:34 EDT      Electronically signed by Ashli Frances MD at 10/26/24 2035          Physician Progress Notes (last 4 days)        Ellen Watson, NP at 10/28/24 0818          [facility]  Postpartum Note    Subjective   Postpartum Day 2:  Primary Low Transverse  Section    Patient without complaints. Her pain is moderately controlled with nonsteroidal anti-inflammatory drugs and prescribed pain medications. She is ambulating well.  Patient describes her bleeding as thin lochia. C/o frontal headache this morning, worse when sitting up and leaning forward. Paige PO intake. +flatus. No dizziness or fatigue. No SOA or chest pain.     Breastfeeding: Pumping for infant nutrition, Infant in NICU    Objective     Vitals:  Vitals:    10/27/24 2025 10/27/24 2311 10/28/24 0251 10/28/24 0712   BP: 139/93 130/77 131/74 143/84   BP Location: Right arm Right arm Right arm Right arm   Patient Position: Sitting Sitting Lying Lying   Pulse: 63 77 78 64   Resp: 17 18 17 15   Temp: 97.6 °F (36.4 °C) 98.6 °F (37 °C) 98.5 °F (36.9 °C) 97.5 °F (36.4 °C)   TempSrc: Oral Oral Axillary Oral   SpO2: 96% 96% 97% 97%   Weight:  131 kg (288 lb)     Height:           Physical Exam:  General:  Alert and oriented x3. No acute distress.  Abdomen: abdomen is soft without significant tenderness, masses, organomegaly or guarding. Fundus: appropriate, firm, non tender  Incision: Clean/Dry/Intact and Bandage in Place  Skin: Warm, Dry  Extremities: Normal,  trace edema. Nontender     Labs:  Results from last 7 days   Lab Units 10/27/24  0340 10/26/24  0151 10/25/24  1713   WBC 10*3/mm3 19.11* 13.67* 12.48*   HEMOGLOBIN g/dL 11.7* 13.6 13.4   HEMATOCRIT % 34.3 40.6 38.7   PLATELETS 10*3/mm3 215 238 218     Results from last 7 days   Lab Units 10/26/24  0151   GLUCOSE mg/dL 114*        Feeding method: Breastfeeding Status:  Yes     Blood Type: RH Positive        Assessment & Plan     Principal Problem:    Preeclampsia  Active Problems:     delivery delivered      Lynn Soto is Day 2  post-partum from a  Primary Low Transverse  Section      Plan:  routine, continue present management, encourage ambulation, monitor BPs, and monitor pain.   Rec anesthesia Consult to evaluate h/a. BP WNL, on medication. Rec hydration/rest as well - continue to monitor BP    Ellen Watson NP  10/28/2024  08:18 EDT                   Electronically signed by Ellen Watson NP at 10/28/24 1039       Ashli Frances MD at 10/27/24 0947          [facility]  Postpartum Note    Subjective   Postpartum Day 1:  Primary Low Transverse  Section    Patient without complaints. Her pain is well controlled with nonsteroidal anti-inflammatory drugs. She is ambulating and voiding well.  Bleeding is appropriate for pp period.  Bps have been 120//80s.    Objective     Vitals:  Vitals:    10/27/24 0730 10/27/24 0800 10/27/24 0830 10/27/24 0900   BP: 121/70 115/73 126/72 133/82   BP Location:       Patient Position:       Pulse: 58 58 66 65   Resp:       Temp:    97.8 °F (36.6 °C)   TempSrc:       SpO2: 95% 95% 94% 97%   Weight:       Height:         UOP 920cc/4hrs     Physical Exam:  General:  no acute distress.  Abdomen: Fundus firm below umbilicus, nontender and bandage in place  Extremities: moves all extremities well, no cyanosis or erythema, 1+ edema      Labs:  Results from last 7 days   Lab Units 10/27/24  0340 10/26/24  0151 10/25/24  1713   WBC 10*3/mm3 19.11* 13.67* 12.48*   HEMOGLOBIN g/dL 11.7* 13.6 13.4   HEMATOCRIT % 34.3 40.6 38.7   PLATELETS 10*3/mm3 215 238 218     Results from last 7 days   Lab Units 10/26/24  0151   GLUCOSE mg/dL 114*          Feeding method: Breastfeeding Status: Unknown     Blood Type: RH Positive        Assessment & Plan     Principal Problem:    Preeclampsia  Active Problems:      delivery delivered      Lynn Soto is Day 1  post-partum from a  Primary Low Transverse  Section      Plan:  Continue current care  Will continue Magnesium for 24hrs pp.      Ashli Frances MD  10/27/2024  09:47 EDT                Electronically signed by Ashli Frances MD at 10/27/24 0948       Ashli Frances MD at 10/26/24 1854          Pt comfortable c epidural.   FHTs have been persistently category 2  Some episodes of late decelerations and some episodes with minimal variability and no decelerations.    SVE /-2  Discussed c pt that fetal station is still high and baby did not have any improvement in tracing with amnioinfusion or many position changes.  Contractions are not adequate and when they are more strong/ consistent there are more late decelerations.    I discussed c pt and her  that as she is remote from delivery with persistent episodes of decelerations in the setting of a 33 wk baby with magnesium my concern is not wanting to unduly stress the baby in addition to the stress of prematurity and my concern for the baby to tolerate a prolonged labor and or pushing if needed.    I also discussed with them the risks of  delivery to mom and baby and the benefits of continued IOL and the risks of continued IOL.  Pt given the option of proceeding c C/S now versus continued trial of IOL.   Pt and  were given time to ask questions and discuss and they would like to proceed c a  delivery at this time.    C/S edu done.  Will proceed.     Electronically signed by Ashli Frances MD at 10/26/24 1901       Ashli Frances MD at 10/26/24 1313          [facility]  Obstetric Progress Note    Subjective   Comfortable c epidural.     Objective     Vitals:  Vitals:    10/26/24 1100 10/26/24 1200 10/26/24 1217 10/26/24 1300   BP: 123/79 133/77 132/75 122/67   BP Location:       Patient Position:       Pulse: 72 70 70 72   Resp:       Temp:    "    TempSrc:       SpO2: 91% 95%     Weight:       Height:         Flowsheet Rows      Flowsheet Row First Filed Value   Admission Height 172.7 cm (68\") Documented at 10/25/2024 0815   Admission Weight 131 kg (289 lb 7.4 oz) Documented at 10/25/2024 0815            Intake/Output Summary (Last 24 hours) at 10/26/2024 1313  Last data filed at 10/26/2024 0900  Gross per 24 hour   Intake 4024 ml   Output 1550 ml   Net 2474 ml       Fetal Heart Rate Assessment:   Category 1, minimal variability  [place]:  q 3    Physical Exam:  General: Patient is in no acute distress    Pelvic Exam: was checked (by me): 3 cm / 50% % / -2            Assessment & Plan     Principal Problem:    Preeclampsia         Assessment:  1.  Intrauterine pregnancy at 33w4d gestation.    2.  Induction of labor due to severe preE  3.  GBS status: Unknown    Plan:  1. Pitocin induction. and PCN for GBS.  Continue magnesium seizure prophylaxis.   2. Plan of care has been reviewed with patient.  3.  Risks, benefits of treatment plan have been discussed.  4.  All questions have been answered.        Ashli Frances MD  10/26/2024  13:13 EDT      Electronically signed by Ashli Frances MD at 10/26/24 1315       Ashli Frances MD at 10/26/24 0947          [facility]  [facility] Progress Note    Subjective   Pt without complaint.  Bps mostly mild range Max BP was 176/97 1045 am on 10/25 but have been mild since that time.     Objective     Vitals:  Vitals:    10/26/24 0700 10/26/24 0800 10/26/24 0830 10/26/24 0900   BP: 151/92 130/79 121/63 153/87   BP Location:       Patient Position:       Pulse: 85 70 72 74   Resp:       Temp:  97.9 °F (36.6 °C)     TempSrc:       SpO2: 94%  95%    Weight:       Height:         Flowsheet Rows      Flowsheet Row First Filed Value   Admission Height 172.7 cm (68\") Documented at 10/25/2024 0815   Admission Weight 131 kg (289 lb 7.4 oz) Documented at 10/25/2024 0815            Intake/Output Summary (Last 24 hours) " at 10/26/2024 0947  Last data filed at 10/26/2024 0900  Gross per 24 hour   Intake 4024 ml   Output 1550 ml   Net 2474 ml       Fetal Heart Rate Assessment:   Category 1  Darrington:  q 3    Physical Exam:  General: Patient is in no acute distress    Pelvic Exam: was checked (by me): 2 cm / 50% % / -2, AROM- Clear, and IUPC placed without difficulty   CRB removed without difficulty.          Assessment & Plan     Principal Problem:    Preeclampsia         Assessment:  1.  Intrauterine pregnancy at 33w4d gestation.    2.  Induction of labor due to preeclamspia with severe features.   3.  GBS status: Unknown    Plan:  1. Pitocin induction., PCN for GBS. , and has rec'd BMZ and NICU consult.  Continue Magnesium seizure prophylaxis.   2. Plan of care has been reviewed with patient.  3.  Risks, benefits of treatment plan have been discussed.  4.  All questions have been answered.        Ashli Frances MD  10/26/2024  09:47 EDT      Electronically signed by Ashli Frances MD at 10/26/24 0951          Consult Notes (last 4 days)        Jacy Marie APRN at 10/25/24 1551        Consult Orders    1. Inpatient Neonatology Consult [[id number]] ordered by Jessenia Le MD at 10/25/24 1314                 Prenatal Consult    Referring OB:  Rey    Reason for Consultation: potential  delivery of female at 33w3 weeks gestation    Maternal History:   Lynn is a 23 y.o. yr old  with: preeclampsia  Estimated Date of Delivery: 12/10/24    Prenatal History, Physical Exam, US and labs were reviewed and pertinent findings as below:  Steroids: Started on this admission, 1st dose: 10/25 1347  US report: resolved renal pelvis dilation    Current Delivery Plan: IOL, vaginal anticipated    MATERNAL PRENATAL LABS:      MBT: O Pos  AB: Neg   RUBELLA: Immune  GBS Culture: Unknown  HBsAg: Negative   RPR: Negative  HIV: Negative  HEP C Ab: Negative  HSV Hx: Not done  UDS: Not done    Genetic Testing: abnormal  "Qnatal, indeterminate for 1p36 deletion-MFM couseled on risk of microdeletion associated with hypotonia and intellectual disabilities.      CONSULT  Present for discussion were: maternal grandmother and mother    Concerns voiced by the Parents/Family included: none at this time    Anticipated Infant's name: \"Rajesh\"     Discussion topics included:   Discussed implications of maternal preeclampsia and need for  delivery. Also discussed respiratory distress syndrome, beneficial effects of steroids, apnea of prematurity, feeding difficulty, and temperature instability.    Estimated length of stay: ~ ~5-6 weeks    Discussed the importance of providing human milk (mother's or donor).  Discussed policies and procedures for NICU.  Discussed structure and function of NICU providers and unit.     ASSESSMENT  Diagnosis for Consultation: Maternal preeclampsia with risk for  delivery at 33w3d  gestation.    PLAN  NNP and NICU team will plan to attend delivery if delivers  at <36 weeks or CS <37 weeks, or at request of OB.   resuscitation: full resuscitation    Additional comments: MOB plans to provide breast milk    Thank you for allowing us to participate in the care of your patient. Should any further questions or concerns arise please do not hesitate to contact us.    Jacy VALENZUELAP-BC  10/25/24  15:51 EDT    Electronically signed by Jacy Marie APRN at 10/25/24 8323       "

## 2024-10-28 NOTE — PROGRESS NOTES
[facility]  Postpartum Note    Subjective   Postpartum Day 2:  Primary Low Transverse  Section    Patient without complaints. Her pain is moderately controlled with nonsteroidal anti-inflammatory drugs and prescribed pain medications. She is ambulating well.  Patient describes her bleeding as thin lochia. C/o frontal headache this morning, worse when sitting up and leaning forward. Paige PO intake. +flatus. No dizziness or fatigue. No SOA or chest pain.     Breastfeeding: Pumping for infant nutrition, Infant in NICU    Objective     Vitals:  Vitals:    10/27/24 2025 10/27/24 2311 10/28/24 0251 10/28/24 0712   BP: 139/93 130/77 131/74 143/84   BP Location: Right arm Right arm Right arm Right arm   Patient Position: Sitting Sitting Lying Lying   Pulse: 63 77 78 64   Resp: 17 18 17 15   Temp: 97.6 °F (36.4 °C) 98.6 °F (37 °C) 98.5 °F (36.9 °C) 97.5 °F (36.4 °C)   TempSrc: Oral Oral Axillary Oral   SpO2: 96% 96% 97% 97%   Weight:  131 kg (288 lb)     Height:           Physical Exam:  General:  Alert and oriented x3. No acute distress.  Abdomen: abdomen is soft without significant tenderness, masses, organomegaly or guarding. Fundus: appropriate, firm, non tender  Incision: Clean/Dry/Intact and Bandage in Place  Skin: Warm, Dry  Extremities: Normal,  trace edema. Nontender     Labs:  Results from last 7 days   Lab Units 10/27/24  0340 10/26/24  0151 10/25/24  1713   WBC 10*3/mm3 19.11* 13.67* 12.48*   HEMOGLOBIN g/dL 11.7* 13.6 13.4   HEMATOCRIT % 34.3 40.6 38.7   PLATELETS 10*3/mm3 215 238 218     Results from last 7 days   Lab Units 10/26/24  0151   GLUCOSE mg/dL 114*        Feeding method: Breastfeeding Status: Yes     Blood Type: RH Positive        Assessment & Plan     Principal Problem:    Preeclampsia  Active Problems:     delivery delivered      Lynn Gallagherle is Day 2  post-partum from a  Primary Low Transverse  Section      Plan:  routine, continue present management, encourage  ambulation, monitor BPs, and monitor pain.   Rec anesthesia Consult to evaluate h/a. BP WNL, on medication. Rec hydration/rest as well - continue to monitor BP    Ellen Watson NP  10/28/2024  08:18 EDT

## 2024-10-28 NOTE — PLAN OF CARE
Goal Outcome Evaluation:  Plan of Care Reviewed With: patient, family           Outcome Evaluation: Patient is a 24 y/o F,, who came to [facility] 33w4d gestation. She had a  birth 10/26/24. During today's evaluation PT provided pt with handout regarding postpartum healing post  birth. Reviewed benefits of abdominal brace, and how long/when to wear during postpartum healing period. She demonstrated good understanding of material after education. Provided pt with handout to keep, and contact information is present if pt has any additional PT needs/questions while in the hospital or post discharge.     Patient education provided on:   -Body changes after pregnancy  -Pelvic floor musculature, Transversus abdominus muscle  -Diaphragmatic breathing  -Proper kegel performance, as well as importance of relaxation   -LORENZO  -Body mechanics   -Proper breathing/pressure management   -Toileting posture   -Benefits of exercise  -Basic/beginning exercise 0-2 weeks, 2-6 weeks, and after 6 weeks  -Postpartum safe stretches   -UI  -  Scar work   -When it may be beneficial to see a Pelvic PT

## 2024-10-28 NOTE — LACTATION NOTE
This note was copied from a baby's chart.  Pt met in nicu and in room for pumping, for 6-8 ml. parents pleased. Reviewed pumping process and washing pump kit parts. Needed supplies provided, microsteamed pump parts in nicu.

## 2024-10-28 NOTE — THERAPY EVALUATION
Patient Name: Lynn Soto  : 2000    MRN: [id number]                              Today's Date: 10/28/2024       Admit Date: 10/25/2024    Visit Dx:     ICD-10-CM ICD-9-CM   1. Pre-eclampsia in third trimester  O14.93 642.43     Patient Active Problem List   Diagnosis    Missed     Recurrent pregnancy loss    Elevated blood pressure reading    Upper respiratory infection    Mild preeclampsia, third trimester    Preeclampsia     delivery delivered     History reviewed. No pertinent past medical history.  Past Surgical History:   Procedure Laterality Date    D & C WITH SUCTION N/A 2024    Procedure: DILATATION AND CURETTAGE WITH SUCTION;  Surgeon: Jessenia Le MD;  Location: [facility] MAIN OR;  Service: Obstetrics/Gynecology;  Laterality: N/A;    MOUTH SURGERY      TONSILLECTOMY        General Information       Row Name 10/28/24 1301          Physical Therapy Time and Intention    Document Type evaluation  -EJ     Mode of Treatment physical therapy  -EJ       Row Name 10/28/24 1301          General Information    Patient Profile Reviewed yes  -EJ     Prior Level of Function independent:  -EJ     Existing Precautions/Restrictions lifting  -EJ     Barriers to Rehab none identified  -EJ       Row Name 10/28/24 1301          Living Environment    People in Home spouse  -EJ     Name(s) of People in Home Jeremy (spouse)  -EJ       Row Name 10/28/24 1301          Cognition    Orientation Status (Cognition) oriented x 4  -EJ               User Key  (r) = Recorded By, (t) = Taken By, (c) = Cosigned By      Initials Name Provider Type    EJ Dorothy Thomas, PT Physical Therapist                   Mobility       Row Name 10/28/24 1303          Bed Mobility    Bed Mobility bed mobility (all) activities  -EJ     All Activities, Lyon (Bed Mobility) modified independence  -EJ       Row Name 10/28/24 1303          Sit-Stand Transfer    Sit-Stand Lyon (Transfers) modified independence   -       Row Name 10/28/24 1303          Gait/Stairs (Locomotion)    Davidson Level (Gait) modified independence  -     Distance in Feet (Gait) --  Pt is able to be up ad raman in her room and hallway as tolerated.  -EJ               User Key  (r) = Recorded By, (t) = Taken By, (c) = Cosigned By      Initials Name Provider Type    Dorothy Millard, PT Physical Therapist                   Obj/Interventions       Row Name 10/28/24 1304          Range of Motion Comprehensive    Comment, General Range of Motion Mild trunk ROM deficits related to  section pain/limitaitons.  -       Row Name 10/28/24 1304          Strength Comprehensive (MMT)    Comment, General Manual Muscle Testing (MMT) Assessment Pt strength NT this date due to recent delivery.  Pt may benefit from pelvic floor/core strengthening/assessment once able.  -EJ               User Key  (r) = Recorded By, (t) = Taken By, (c) = Cosigned By      Initials Name Provider Type    Dorothy Millard, PT Physical Therapist                   Goals/Plan    No documentation.                  Clinical Impression       Row Name 10/28/24 1304          Pain    Pain Location abdomen  -     Pain Side/Orientation lower  -     Additional Documentation Pain Scale: FACES Pre/Post-Treatment (Group)  -[facility] Name 10/28/24 1304          Pain Scale: FACES Pre/Post-Treatment    Pain: FACES Scale, Pretreatment 4-->hurts little more  -     Posttreatment Pain Rating 4-->hurts little more  -       Row Name 10/28/24 1304          Plan of Care Review    Plan of Care Reviewed With patient;family  -     Outcome Evaluation Patient is a 22 y/o F,, who came to [facility] 33w4d gestation. She had a  birth 10/26/24. During today's evaluation PT provided pt with handout regarding postpartum healing post  birth. Reviewed benefits of abdominal brace, and how long/when to wear during postpartum healing period. She demonstrated good understanding of  material after education. Provided pt with handout to keep, and contact information is present if pt has any additional PT needs/questions while in the hospital or post discharge.  -       Row Name 10/28/24 1304          Therapy Assessment/Plan (PT)    Criteria for Skilled Interventions Met (PT) no  -EJ     Therapy Frequency (PT) evaluation only  -EJ     Predicted Duration of Therapy Intervention (PT) discharge  -       Row Name 10/28/24 1304          Positioning and Restraints    Pre-Treatment Position sitting in chair/recliner  -EJ     Post Treatment Position chair  -EJ     In Chair sitting;call light within reach;with family/caregiver  -EJ               User Key  (r) = Recorded By, (t) = Taken By, (c) = Cosigned By      Initials Name Provider Type    Dorothy Millard, PT Physical Therapist                   Outcome Measures       Row Name 10/28/24 1306          How much help from another person do you currently need...    Turning from your back to your side while in flat bed without using bedrails? 4  -EJ     Moving from lying on back to sitting on the side of a flat bed without bedrails? 4  -EJ     Moving to and from a bed to a chair (including a wheelchair)? 4  -EJ     Standing up from a chair using your arms (e.g., wheelchair, bedside chair)? 4  -EJ     Climbing 3-5 steps with a railing? 4  -EJ     To walk in hospital room? 4  -EJ     AM-PAC 6 Clicks Score (PT) 24  -EJ     Highest Level of Mobility Goal 8 --> Walked 250 feet or more  -       Row Name 10/28/24 1306          Functional Assessment    Outcome Measure Options AM-PAC 6 Clicks Basic Mobility (PT)  -EJ               User Key  (r) = Recorded By, (t) = Taken By, (c) = Cosigned By      Initials Name Provider Type    Dorothy Millard, PT Physical Therapist                                 Physical Therapy Education       Title: PT OT SLP Therapies (Done)       Topic: Physical Therapy (Done)       Point: Mobility training (Done)       Learning  Progress Summary            Patient Acceptance, E,H, VU by  at 10/28/2024 1306                      Point: Home exercise program (Done)       Learning Progress Summary            Patient Acceptance, E,H, VU by  at 10/28/2024 1306                      Point: Body mechanics (Done)       Learning Progress Summary            Patient Acceptance, E,H, VU by  at 10/28/2024 1306                      Point: Precautions (Done)       Learning Progress Summary            Patient Acceptance, E,H, VU by  at 10/28/2024 1306                                      User Key       Initials Effective Dates Name Provider Type Discipline     24 -  Dorothy Thomas, PT Physical Therapist PT                  PT Recommendation and Plan     Outcome Evaluation: Patient is a 24 y/o F,, who came to [facility] 33w4d gestation. She had a  birth 10/26/24. During today's evaluation PT provided pt with handout regarding postpartum healing post  birth. Reviewed benefits of abdominal brace, and how long/when to wear during postpartum healing period. She demonstrated good understanding of material after education. Provided pt with handout to keep, and contact information is present if pt has any additional PT needs/questions while in the hospital or post discharge.     Patient education provided on:   -Body changes after pregnancy  -Pelvic floor musculature, Transversus abdominus muscle  -Diaphragmatic breathing  -Proper kegel performance, as well as importance of relaxation   -LORENZO  -Body mechanics   -Proper breathing/pressure management   -Toileting posture   -Benefits of exercise  -Basic/beginning exercise 0-2 weeks, 2-6 weeks, and after 6 weeks  -Postpartum safe stretches   -UI  -  Scar work   -When it may be beneficial to see a Pelvic PT       Time Calculation:         PT Charges       Row Name 10/28/24 1307             Time Calculation    Start Time 09  -      Stop Time 1010  -      Time  Calculation (min) 26 min  -EJ      PT Received On 10/28/24  -EJ         Time Calculation- PT    Total Timed Code Minutes- PT 0 minute(s)  -EJ                User Key  (r) = Recorded By, (t) = Taken By, (c) = Cosigned By      Initials Name Provider Type    Dorothy Millard, PT Physical Therapist                  Therapy Charges for Today       Code Description Service Date Service Provider Modifiers Qty    47090969645 HC PT EVAL LOW COMPLEXITY 4 10/28/2024 Dorothy Thomas, PT GP 1            PT G-Codes  Outcome Measure Options: AM-PAC 6 Clicks Basic Mobility (PT)  AM-PAC 6 Clicks Score (PT): 24  PT Discharge Summary  Anticipated Discharge Disposition (PT): home    Dorothy Thomas, JACQUELINE  10/28/2024

## 2024-10-28 NOTE — PLAN OF CARE
Goal Outcome Evaluation:  Plan of Care Reviewed With: patient, spouse        Progress: improving  Outcome Evaluation: Pt is ambulating independently and voiding appropriately. Fundus and lochia WNL. Vitals remain stable. Pt went to NICU once this shift to do skin to skin with infant. Pt pumping frequently. Pain controlled with scheduled medications.

## 2024-10-29 ENCOUNTER — ANESTHESIA (OUTPATIENT)
Dept: OBSTETRICS AND GYNECOLOGY | Facility: HOSPITAL | Age: 24
End: 2024-10-29
Payer: COMMERCIAL

## 2024-10-29 ENCOUNTER — ANESTHESIA EVENT (OUTPATIENT)
Dept: OBSTETRICS AND GYNECOLOGY | Facility: HOSPITAL | Age: 24
End: 2024-10-29
Payer: COMMERCIAL

## 2024-10-29 LAB
BASOPHILS # BLD AUTO: 0.03 10*3/MM3 (ref 0–0.2)
BASOPHILS NFR BLD AUTO: 0.3 % (ref 0–1.5)
DEPRECATED RDW RBC AUTO: 53.1 FL (ref 37–54)
EOSINOPHIL # BLD AUTO: 0.27 10*3/MM3 (ref 0–0.4)
EOSINOPHIL NFR BLD AUTO: 2.8 % (ref 0.3–6.2)
ERYTHROCYTE [DISTWIDTH] IN BLOOD BY AUTOMATED COUNT: 14.8 % (ref 12.3–15.4)
HCT VFR BLD AUTO: 32.3 % (ref 34–46.6)
HGB BLD-MCNC: 10.4 G/DL (ref 12–15.9)
IMM GRANULOCYTES # BLD AUTO: 0.15 10*3/MM3 (ref 0–0.05)
IMM GRANULOCYTES NFR BLD AUTO: 1.6 % (ref 0–0.5)
LYMPHOCYTES # BLD AUTO: 1.9 10*3/MM3 (ref 0.7–3.1)
LYMPHOCYTES NFR BLD AUTO: 19.9 % (ref 19.6–45.3)
MCH RBC QN AUTO: 31.9 PG (ref 26.6–33)
MCHC RBC AUTO-ENTMCNC: 32.2 G/DL (ref 31.5–35.7)
MCV RBC AUTO: 99.1 FL (ref 79–97)
MONOCYTES # BLD AUTO: 0.5 10*3/MM3 (ref 0.1–0.9)
MONOCYTES NFR BLD AUTO: 5.2 % (ref 5–12)
NEUTROPHILS NFR BLD AUTO: 6.68 10*3/MM3 (ref 1.7–7)
NEUTROPHILS NFR BLD AUTO: 70.2 % (ref 42.7–76)
NRBC BLD AUTO-RTO: 0 /100 WBC (ref 0–0.2)
PLATELET # BLD AUTO: 156 10*3/MM3 (ref 140–450)
PMV BLD AUTO: 9.4 FL (ref 6–12)
RBC # BLD AUTO: 3.26 10*6/MM3 (ref 3.77–5.28)
WBC NRBC COR # BLD AUTO: 9.53 10*3/MM3 (ref 3.4–10.8)

## 2024-10-29 PROCEDURE — 85025 COMPLETE CBC W/AUTO DIFF WBC: CPT | Performed by: NURSE PRACTITIONER

## 2024-10-29 PROCEDURE — 25010000002 TETANUS-DIPHTH-ACELL PERTUSSIS 5-2.5-18.5 LF-MCG/0.5 SUSPENSION PREFILLED SYRINGE: Performed by: STUDENT IN AN ORGANIZED HEALTH CARE EDUCATION/TRAINING PROGRAM

## 2024-10-29 PROCEDURE — 90715 TDAP VACCINE 7 YRS/> IM: CPT | Performed by: STUDENT IN AN ORGANIZED HEALTH CARE EDUCATION/TRAINING PROGRAM

## 2024-10-29 PROCEDURE — 90471 IMMUNIZATION ADMIN: CPT | Performed by: STUDENT IN AN ORGANIZED HEALTH CARE EDUCATION/TRAINING PROGRAM

## 2024-10-29 RX ORDER — LABETALOL 200 MG/1
200 TABLET, FILM COATED ORAL 2 TIMES DAILY
Qty: 60 TABLET | Refills: 1 | Status: SHIPPED | OUTPATIENT
Start: 2024-10-29 | End: 2024-10-31 | Stop reason: HOSPADM

## 2024-10-29 RX ORDER — IBUPROFEN 600 MG/1
600 TABLET, FILM COATED ORAL EVERY 6 HOURS
Qty: 30 TABLET | Refills: 0 | Status: SHIPPED | OUTPATIENT
Start: 2024-10-29

## 2024-10-29 RX ORDER — LABETALOL 200 MG/1
200 TABLET, FILM COATED ORAL 3 TIMES DAILY
Status: DISCONTINUED | OUTPATIENT
Start: 2024-10-29 | End: 2024-10-31

## 2024-10-29 RX ORDER — OXYCODONE HYDROCHLORIDE 5 MG/1
5 TABLET ORAL EVERY 6 HOURS PRN
Qty: 10 TABLET | Refills: 0 | Status: SHIPPED | OUTPATIENT
Start: 2024-10-29 | End: 2024-11-01

## 2024-10-29 RX ADMIN — IBUPROFEN 600 MG: 600 TABLET, FILM COATED ORAL at 11:08

## 2024-10-29 RX ADMIN — IBUPROFEN 600 MG: 600 TABLET, FILM COATED ORAL at 17:59

## 2024-10-29 RX ADMIN — IBUPROFEN 600 MG: 600 TABLET, FILM COATED ORAL at 00:48

## 2024-10-29 RX ADMIN — ACETAMINOPHEN 650 MG: 325 TABLET, FILM COATED ORAL at 20:31

## 2024-10-29 RX ADMIN — ACETAMINOPHEN 650 MG: 325 TABLET, FILM COATED ORAL at 16:08

## 2024-10-29 RX ADMIN — ACETAMINOPHEN 650 MG: 325 TABLET, FILM COATED ORAL at 01:56

## 2024-10-29 RX ADMIN — PRENATAL VITAMINS-IRON FUMARATE 27 MG IRON-FOLIC ACID 0.8 MG TABLET 1 TABLET: at 20:31

## 2024-10-29 RX ADMIN — LABETALOL HYDROCHLORIDE 200 MG: 200 TABLET, FILM COATED ORAL at 09:00

## 2024-10-29 RX ADMIN — OXYCODONE HYDROCHLORIDE 10 MG: 5 TABLET ORAL at 11:08

## 2024-10-29 RX ADMIN — ACETAMINOPHEN 650 MG: 325 TABLET, FILM COATED ORAL at 08:10

## 2024-10-29 RX ADMIN — IBUPROFEN 600 MG: 600 TABLET, FILM COATED ORAL at 05:08

## 2024-10-29 RX ADMIN — DOCUSATE SODIUM 100 MG: 100 CAPSULE, LIQUID FILLED ORAL at 20:31

## 2024-10-29 RX ADMIN — LABETALOL HYDROCHLORIDE 200 MG: 200 TABLET, FILM COATED ORAL at 20:31

## 2024-10-29 RX ADMIN — LABETALOL HYDROCHLORIDE 200 MG: 200 TABLET, FILM COATED ORAL at 16:08

## 2024-10-29 RX ADMIN — TETANUS TOXOID, REDUCED DIPHTHERIA TOXOID AND ACELLULAR PERTUSSIS VACCINE, ADSORBED 0.5 ML: 5; 2.5; 8; 8; 2.5 SUSPENSION INTRAMUSCULAR at 08:59

## 2024-10-29 NOTE — ANESTHESIA PROCEDURE NOTES
Blood Patch      Patient location during procedure: OB  Blood patch placed: 10/29/2024 12:20 PM  Stop Time:10/29/2024 12:28 PM    Indications for Blood Patch: dural puncture and headache  Staff  Anesthesiologist: Chuck Bragg MD  Resident/GAVIN/CAA: Bell Knight CRNA  Preanesthetic Checklist  Completed: patient identified, IV checked, site marked, risks and benefits discussed, surgical consent, monitors and equipment checked, pre-op evaluation and timeout performed  Blood Patch Prep  Patient position: sitting  Sterile Tech: gloves, cap, mask and sterile barrier.  Prep: ChloraPrep  Patient monitoring: blood pressure monitoring, continuous pulse ox and EKG  Location: L4-L5  Blood patch source: right antecubital IV.  Blood Patch Procedure  Sedation:no    Approach: midline   Guidance: landmark technique  Needle Gauge 17 G  Needle Type: Tuohy  Solution used: autologous blood  Loss of Resistance: 9 cm  Loss of Resistance Medium: saline  Blood Patch Source:venous    Venous blood amount injected (mL): 13.  Assessment  Patient tolerance:patient tolerated the procedure well with no apparent complications  Complications:no

## 2024-10-29 NOTE — LACTATION NOTE
This note was copied from a baby's chart.  Pt seen in nicu, holding baby, states she did not pump last might as she was tired. Encouraged to pump 8-10 times in 24 hrs to establish milk production. Reports nipple tenderness, will apply nipple cream and Medela gel patches. Needed supplies provided, microsteamed pump kit parts in nicu. Will call for help as needed.

## 2024-10-29 NOTE — PLAN OF CARE
Goal Outcome Evaluation:      Patient ambulates to NICU and around room without difficulty. Patient has pitting edema in lower extremities and was educated on importance of increasing oral fluid intake and frequent ambulation. Patient verbalized understanding. Patient was complaining of headache at start of shift which increased in severity and patient complained of the pain in the back of her head and shoulders. Anesthesia was consulted and decided to perform a blood patch to correct spinal headache. Procedure was performed and patient was instructed to lie flat in bed without lights on. Patient complied and reported a decrease in headache pain. During recovery from blood patch, patient's blood pressure was elevated and Dr. Vargas was consulted. Dr. Vargas increased frequency of labetalol to TID. Patient denies vision changes or epigastric pain. Will continue to monitor.

## 2024-10-29 NOTE — DISCHARGE SUMMARY
[facility]  Delivery Discharge Summary    Primary OB Clinician: Jessenia Le MD    Admission Diagnosis:  Principal Problem:    Preeclampsia  Active Problems:     delivery delivered      Discharge Diagnosis:  delivered    Gestational Age: 33w4d    Date of Delivery: 10/26/2024    Delivered By:  Ashli Frances    Delivery Type: , Low Transverse     Tubal Ligation: n/a    Intrapartum Course: Uncomplicated delivery.     Postpartum Course:  Pt was admitted and underwent  Primary Low Transverse  Section for severe preeclampsia and fetal intolerance to labor. Pt received Magnesium Sulfate and BP have remained stable. She is to continue Labetalol 200mg PO BID as directed. Denies CNS s/s, no SOA or chest pain. Pt was transferred to PP where she had an uncomplicated course. Pt remained AFVSS, had scant lochia and pain was well controlled. Pt d/c home in stable condition and will f/u in office for PP visit as scheduled or PRN. Currently pumping as infant is in NICU. Plans on condoms  for contraception.     Physical Exam:    Vitals:   Vitals:    10/28/24 2042 10/28/24 2321 10/29/24 0300 10/29/24 0720   BP:  145/81 146/87 150/80   BP Location:  Right arm Right arm Right arm   Patient Position:  Lying Lying Lying   Pulse:  57 59 60   Resp:  21 18 17   Temp:  98.5 °F (36.9 °C) 97.6 °F (36.4 °C) 97.9 °F (36.6 °C)   TempSrc:  Oral Oral Oral   SpO2:  98% 96% 96%   Weight: 129 kg (285 lb 0.9 oz)      Height:         Temp (24hrs), Av °F (36.7 °C), Min:97.5 °F (36.4 °C), Max:98.5 °F (36.9 °C)      General Appearance:    Alert, cooperative, in no acute distress   Abdomen:     Soft non-tender, non-distended, no guarding, no rebound         tenderness.   Extremities:   Moves all extremities well, no edema, no cyanosis, no              Redness.   Incision:  Clean/Dry/Intact and Sutures intact   Fundus:   Firm, below umbilicus     Feeding method: Breastfeeding Status: Yes    Labs:  Results from last 7 days    Lab Units 10/29/24  0930 10/27/24  0340 10/26/24  0151   WBC 10*3/mm3 9.53 19.11* 13.67*   HEMOGLOBIN g/dL 10.4* 11.7* 13.6   HEMATOCRIT % 32.3* 34.3 40.6   PLATELETS 10*3/mm3 156 215 238     Results from last 7 days   Lab Units 10/26/24  0151   GLUCOSE mg/dL 114*       Blood Type: RH Positive      Plan:  Discharge to home.    Follow-up appointment with Dr Le in 6 weeks and to RTO in 3 days for BP check in office.   All discharge instructions were reviewed with pt including bleeding warnings, s/sx of PP depression, and warning signs in the PP period for which to seek medical attention including but not limited to s/sx of hypertension and thromboembolism.     Ellen Watson NP  10/29/2024  10:14 EDT

## 2024-10-29 NOTE — PLAN OF CARE
Goal Outcome Evaluation:      Pt has rested well throughout the night. Pain is controlled with ibuprofen and tylenol. Fundus is firm and bleeding is scant to light. Incision looks good, steri-strips intact with old drainage on them. She has ambulated in her room, the hallway and to the NICU to see baby. She has pumped throughout the night as well. Hopes to discharge and become a boarder later today.     Progress: improving

## 2024-10-30 ENCOUNTER — APPOINTMENT (OUTPATIENT)
Dept: CARDIOLOGY | Facility: HOSPITAL | Age: 24
End: 2024-10-30
Payer: COMMERCIAL

## 2024-10-30 LAB
AORTIC DIMENSIONLESS INDEX: 0.73 (DI)
BH CV ECHO MEAS - ACS: 2.1 CM
BH CV ECHO MEAS - AO MAX PG: 12.8 MMHG
BH CV ECHO MEAS - AO MEAN PG: 6 MMHG
BH CV ECHO MEAS - AO V2 MAX: 179 CM/SEC
BH CV ECHO MEAS - AO V2 VTI: 42.1 CM
BH CV ECHO MEAS - AVA(I,D): 2.41 CM2
BH CV ECHO MEAS - EDV(CUBED): 125 ML
BH CV ECHO MEAS - EDV(MOD-SP4): 133 ML
BH CV ECHO MEAS - EF(MOD-SP4): 69 %
BH CV ECHO MEAS - ESV(CUBED): 35.9 ML
BH CV ECHO MEAS - ESV(MOD-SP4): 41.2 ML
BH CV ECHO MEAS - FS: 34 %
BH CV ECHO MEAS - IVS/LVPW: 1 CM
BH CV ECHO MEAS - IVSD: 1.1 CM
BH CV ECHO MEAS - LA DIMENSION: 4.9 CM
BH CV ECHO MEAS - LAT PEAK E' VEL: 15.6 CM/SEC
BH CV ECHO MEAS - LV MASS(C)D: 207.1 GRAMS
BH CV ECHO MEAS - LV MAX PG: 6.9 MMHG
BH CV ECHO MEAS - LV MEAN PG: 3 MMHG
BH CV ECHO MEAS - LV V1 MAX: 131 CM/SEC
BH CV ECHO MEAS - LV V1 VTI: 29.3 CM
BH CV ECHO MEAS - LVIDD: 5 CM
BH CV ECHO MEAS - LVIDS: 3.3 CM
BH CV ECHO MEAS - LVOT AREA: 3.5 CM2
BH CV ECHO MEAS - LVOT DIAM: 2.1 CM
BH CV ECHO MEAS - LVPWD: 1.1 CM
BH CV ECHO MEAS - MED PEAK E' VEL: 10.8 CM/SEC
BH CV ECHO MEAS - MR MAX PG: 56.6 MMHG
BH CV ECHO MEAS - MR MAX VEL: 376 CM/SEC
BH CV ECHO MEAS - MV A MAX VEL: 119 CM/SEC
BH CV ECHO MEAS - MV DEC SLOPE: 722.5 CM/SEC2
BH CV ECHO MEAS - MV DEC TIME: 0.19 SEC
BH CV ECHO MEAS - MV E MAX VEL: 135 CM/SEC
BH CV ECHO MEAS - MV E/A: 1.13
BH CV ECHO MEAS - MV MAX PG: 11.6 MMHG
BH CV ECHO MEAS - MV MEAN PG: 3 MMHG
BH CV ECHO MEAS - MV P1/2T: 72.6 MSEC
BH CV ECHO MEAS - MV V2 VTI: 37.5 CM
BH CV ECHO MEAS - MVA(P1/2T): 3 CM2
BH CV ECHO MEAS - MVA(VTI): 2.7 CM2
BH CV ECHO MEAS - PA V2 MAX: 136 CM/SEC
BH CV ECHO MEAS - QP/QS: 1
BH CV ECHO MEAS - RV MAX PG: 5.1 MMHG
BH CV ECHO MEAS - RV V1 MAX: 113 CM/SEC
BH CV ECHO MEAS - RV V1 VTI: 29.3 CM
BH CV ECHO MEAS - RVDD: 2.4 CM
BH CV ECHO MEAS - RVOT DIAM: 2.1 CM
BH CV ECHO MEAS - SV(LVOT): 101.5 ML
BH CV ECHO MEAS - SV(MOD-SP4): 91.8 ML
BH CV ECHO MEAS - SV(RVOT): 101.5 ML
BH CV ECHO MEAS - TAPSE (>1.6): 2.9 CM
BH CV ECHO MEAS - TR MAX PG: 22.7 MMHG
BH CV ECHO MEAS - TR MAX VEL: 238 CM/SEC
BH CV ECHO MEASUREMENTS AVERAGE E/E' RATIO: 10.23
BH CV XLRA - TDI S': 15.1 CM/SEC
LAB AP CASE REPORT: NORMAL
LAB AP DIAGNOSIS COMMENT: NORMAL
PATH REPORT.FINAL DX SPEC: NORMAL
PATH REPORT.GROSS SPEC: NORMAL
SINUS: 2.6 CM

## 2024-10-30 PROCEDURE — 99222 1ST HOSP IP/OBS MODERATE 55: CPT | Performed by: INTERNAL MEDICINE

## 2024-10-30 PROCEDURE — 93306 TTE W/DOPPLER COMPLETE: CPT

## 2024-10-30 PROCEDURE — 93306 TTE W/DOPPLER COMPLETE: CPT | Performed by: INTERNAL MEDICINE

## 2024-10-30 RX ORDER — FUROSEMIDE 10 MG/ML
20 INJECTION INTRAMUSCULAR; INTRAVENOUS ONCE
Status: COMPLETED | OUTPATIENT
Start: 2024-10-30 | End: 2024-10-31

## 2024-10-30 RX ADMIN — ACETAMINOPHEN 650 MG: 325 TABLET, FILM COATED ORAL at 02:23

## 2024-10-30 RX ADMIN — DOCUSATE SODIUM 100 MG: 100 CAPSULE, LIQUID FILLED ORAL at 20:49

## 2024-10-30 RX ADMIN — IBUPROFEN 600 MG: 600 TABLET, FILM COATED ORAL at 11:10

## 2024-10-30 RX ADMIN — PRENATAL VITAMINS-IRON FUMARATE 27 MG IRON-FOLIC ACID 0.8 MG TABLET 1 TABLET: at 20:49

## 2024-10-30 RX ADMIN — ACETAMINOPHEN 650 MG: 325 TABLET, FILM COATED ORAL at 15:21

## 2024-10-30 RX ADMIN — DOCUSATE SODIUM 100 MG: 100 CAPSULE, LIQUID FILLED ORAL at 08:57

## 2024-10-30 RX ADMIN — ACETAMINOPHEN 650 MG: 325 TABLET, FILM COATED ORAL at 20:49

## 2024-10-30 RX ADMIN — ACETAMINOPHEN 650 MG: 325 TABLET, FILM COATED ORAL at 08:57

## 2024-10-30 RX ADMIN — LABETALOL HYDROCHLORIDE 200 MG: 200 TABLET, FILM COATED ORAL at 20:49

## 2024-10-30 RX ADMIN — IBUPROFEN 600 MG: 600 TABLET, FILM COATED ORAL at 05:36

## 2024-10-30 RX ADMIN — LABETALOL HYDROCHLORIDE 200 MG: 200 TABLET, FILM COATED ORAL at 15:21

## 2024-10-30 RX ADMIN — IBUPROFEN 600 MG: 600 TABLET, FILM COATED ORAL at 17:10

## 2024-10-30 RX ADMIN — LABETALOL HYDROCHLORIDE 200 MG: 200 TABLET, FILM COATED ORAL at 08:57

## 2024-10-30 NOTE — CONSULTS
Cardiology Consult Note      REQUESTING PHYSICIAN    Jessenia Le MD    PATIENT IDENTIFICATION  Name: Lynn Soto  Age: 23 y.o.  Sex: female  :  2000  MRN: [id number]             REASON FOR CONSULTATION:  23-year-old female with no significant past cardiac history.  Past medical history of traumatic brain injury with some short-term memory deficits      CC:  Postpartum with elevated blood pressure and lower extremity edema    HISTORY OF PRESENT ILLNESS:   Patient presented to the hospital 10/25/2024 at 33 weeks 3 days gestation [id number] for elevated blood pressures and lower extremity edema.  She was diagnosed with new preeclampsia.  She had induction of labor on 10/26/2024 with  section performed due to fetal intolerance of labor.  She continued to have elevated blood pressures.  She had been started on labetalol prepartum and dose frequency increased to 200 mg 3 times daily yesterday.  Cardiology was asked to consult for further recommendations.  Upon my evaluation today, she is sitting on side of the bed and appears comfortable.  She denies any chest pain, pressure or tightness.  She denies any shortness of breath, orthopnea.  She currently has trace edema to both lower extremities.  She does plan to breast-feed.  Her baby is currently in the  intensive care unit.      REVIEW OF SYSTEMS:  Pertinent items are noted in HPI, all other systems reviewed and negative    OBJECTIVE   No labs today to review    ASSESSMENT  Preeclampsia   delivery of female infant  Lower extremity edema  Elevated blood pressures    PLAN  Her pressure has improved significantly on current dosing of labetalol.  Continue at 200 mg 3 times daily.  We will check 2D echocardiogram to evaluate LV and valvular function.  Will discuss any diuresis with cardiologist.  Further recommendations following results of echo and evaluation by cardiologist      CHF Guideline Directed Medical Therapy  Beta Blocker:  "  ARNI/ACE/ARB:   SGLT 2 inhibitors:   Diuretics:   Aldosterone Antagonist:   Vasodilators & Nitrates:     Vital Signs  Visit Vitals  /88   Pulse 78   Temp 97.8 °F (36.6 °C) (Oral)   Resp 19   Ht 172.7 cm (68\")   Wt 127 kg (281 lb)   LMP 03/05/2024 (Exact Date)   SpO2 99%   Breastfeeding Yes   BMI 42.73 kg/m²     Oxygen Therapy  SpO2: 99 %  Pulse Oximetry Type: Intermittent  Device (Oxygen Therapy): room air  Flowsheet Rows      Flowsheet Row First Filed Value   Admission Height 172.7 cm (68\") Documented at 10/25/2024 0815   Admission Weight 131 kg (289 lb 7.4 oz) Documented at 10/25/2024 0815          Intake & Output (last 3 days)         10/27 0701  10/28 0700 10/28 0701  10/29 0700 10/29 0701  10/30 0700 10/30 0701  10/31 0700    P.O. 1240 1200 2180 960    I.V. (mL/kg) 1350 (10.3)       Total Intake(mL/kg) 2590 (19.8) 1200 (9.3) 2180 (17) 960 (7.6)    Urine (mL/kg/hr) 4600 (1.5) 4700 (1.5) 3975 (1.3) 1300 (1.5)    Stool 0 0      Blood        Total Output 4600 4700 3975 1300    Net -2010 -3500 -1795 -340            Urine Unmeasured Occurrence 2 x       Stool Unmeasured Occurrence 1 x 1 x            Lines, Drains & Airways       Active LDAs       None                    MEDICAL HISTORY    History reviewed. No pertinent past medical history.     SURGICAL HISTORY    Past Surgical History:   Procedure Laterality Date    D & C WITH SUCTION N/A 1/11/2024    Procedure: DILATATION AND CURETTAGE WITH SUCTION;  Surgeon: Jessenia Le MD;  Location: [facility] MAIN OR;  Service: Obstetrics/Gynecology;  Laterality: N/A;    MOUTH SURGERY      TONSILLECTOMY          FAMILY HISTORY    Family History   Problem Relation Age of Onset    Heart disease Maternal Grandfather        SOCIAL HISTORY    Social History     Tobacco Use    Smoking status: Never     Passive exposure: Never    Smokeless tobacco: Never   Substance Use Topics    Alcohol use: Never        ALLERGIES    No Known Allergies           /88   Pulse 78   Temp " "97.8 °F (36.6 °C) (Oral)   Resp 19   Ht 172.7 cm (68\")   Wt 127 kg (281 lb)   LMP 03/05/2024 (Exact Date)   SpO2 99%   Breastfeeding Yes   BMI 42.73 kg/m²   Intake/Output last 3 shifts:  I/O last 3 completed shifts:  In: 2660 [P.O.:2660]  Out: 6575 [Urine:6575]  Intake/Output this shift:  I/O this shift:  In: 960 [P.O.:960]  Out: 1300 [Urine:1300]    PHYSICAL EXAM:    General: Alert, cooperative, no distress, appears stated age  Head:  Normocephalic, atraumatic, mucous membranes moist  Eyes:  Conjunctivae/corneas clear, EOM's intact     Neck:  Supple,  no adenopathy; no JVD or bruit  Lungs: Clear to auscultation bilaterally, no wheezes, rhonchi or rales are noted  Chest wall: No tenderness  Heart::  Regular rate and rhythm, S1 and S2 normal, no murmur, rub or gallop  Abdomen: Soft, nontender, nondistended, bowel sounds active  Extremities: Trace edema to lower extremities  Pulses: 2+ and symmetric all extremities  Skin:  No rashes or lesions  Neuro/psych: A&O x3. CN II through XII are grossly intact with appropriate affect      Scheduled Meds:      acetaminophen, 650 mg, Oral, Q6H  docusate sodium, 100 mg, Oral, BID  ibuprofen, 600 mg, Oral, Q6H  labetalol, 200 mg, Oral, TID  prenatal vitamin 27-0.8, 1 tablet, Oral, Nightly        Continuous Infusions:         PRN Meds:      diphenhydrAMINE **OR** diphenhydrAMINE **OR** diphenhydrAMINE    guaiFENesin    HYDROmorphone    oxyCODONE **OR** oxyCODONE    oxytocin    simethicone        Results Review:     I reviewed the patient's new clinical results.    CBC    Results from last 7 days   Lab Units 10/29/24  0930 10/27/24  0340 10/26/24  0151 10/25/24  1713 10/25/24  0940   WBC 10*3/mm3 9.53 19.11* 13.67* 12.48* 10.80   HEMOGLOBIN g/dL 10.4* 11.7* 13.6 13.4 12.1   PLATELETS 10*3/mm3 156 215 238 218 220     Cr Clearance Estimated Creatinine Clearance: 189.3 mL/min (by C-G formula based on SCr of 0.65 mg/dL).  Coag     HbA1C No results found for: \"HGBA1C\"  Blood " "Glucose No results found for: \"POCGLU\"  Infection     CMP   Results from last 7 days   Lab Units 10/26/24  0151 10/25/24  1713 10/25/24  0940   SODIUM mmol/L 131* 135* 138   POTASSIUM mmol/L 4.9 4.6 4.3   CHLORIDE mmol/L 101 104 107   CO2 mmol/L 19.3* 19.4* 20.6*   BUN mg/dL 15 14 14   CREATININE mg/dL 0.65 0.69 0.68   GLUCOSE mg/dL 114* 108* 71   ALBUMIN g/dL 3.2* 3.2* 3.1*   BILIRUBIN mg/dL 0.3 0.2 0.2   ALK PHOS U/L 148* 151* 135*   AST (SGOT) U/L 50* 50* 45*   ALT (SGPT) U/L 53* 49* 43*     ABG      UA      HOA  No results found for: \"POCMETH\", \"POCAMPHET\", \"POCBARBITUR\", \"POCBENZO\", \"POCCOCAINE\", \"POCOPIATES\", \"POCOXYCODO\", \"POCPHENCYC\", \"POCPROPOXY\", \"POCTHC\", \"POCTRICYC\"  Lysis Labs   Results from last 7 days   Lab Units 10/29/24  0930 10/27/24  0340 10/26/24  0151 10/25/24  1713 10/25/24  0940   HEMOGLOBIN g/dL 10.4* 11.7* 13.6 13.4 12.1   PLATELETS 10*3/mm3 156 215 238 218 220   CREATININE mg/dL  --   --  0.65 0.69 0.68     Radiology(recent) No radiology results for the last day            X-rays, labs reviewed personally by physician.    ECG/EMG Results (most recent)       Procedure Component Value Units Date/Time    Adult Transthoracic Echo Complete W/ Cont if Necessary Per Protocol [[id number]] Resulted: 10/30/24 1346     Updated: 10/30/24 1346              Medication Review:   I have reviewed the patient's current medication list  Scheduled Meds:acetaminophen, 650 mg, Oral, Q6H  docusate sodium, 100 mg, Oral, BID  ibuprofen, 600 mg, Oral, Q6H  labetalol, 200 mg, Oral, TID  prenatal vitamin 27-0.8, 1 tablet, Oral, Nightly      Continuous Infusions:   PRN Meds:.  diphenhydrAMINE **OR** diphenhydrAMINE **OR** diphenhydrAMINE    guaiFENesin    HYDROmorphone    oxyCODONE **OR** oxyCODONE    oxytocin    simethicone    Imaging:  Imaging Results (Last 72 Hours)       ** No results found for the last 72 hours. **              Briseyda Trevizo, APRN  10/30/24  13:53 EDT       EMR Dragon/Transcription:   \"Dictated " "utilizing Dragon dictation\".                 Electronically signed by NICHOLAS Puentes, 10/30/24, 1:53 PM EDT.      Cardiology attending:  Seen and examined.  Chart and labs reviewed. Independent interpretations of cardiac testing was performed. History and exam findings are verified with above changes noted.  Assessment and plan notated by APC after being formulated by attending consultant.  Note that greater than 50% of the time spent in care of the patient was provided by attending consultant.    Patient denies any chest pain pressure heaviness or tightness.  No shortness of breath.  She reports feeling sore and tired but otherwise is doing okay.  Did not have hypertension prior to pregnancy and was doing well until her last trimester.  She had a sudden increase in her blood pressure as well as proteinuria necessitating early delivery.  Her blood pressure is slightly elevated but is much better on the 200-3 times daily of labetalol.  She does plan on breast-feeding.  She does have some lower extremity edema as well.  We will give her a one-time dose of diuretic to help with the edema.  Would recommend continuing at 200 mg of labetalol 3 times daily with close follow-up as an outpatient.  She does express intention to have another child at some point in the future.    Physical Exam:    General: Alert, cooperative, no distress, appears stated age  Head:  Normocephalic, atraumatic, mucous membranes moist  Eyes:  Conjunctivae/corneas clear, EOM's intact     Neck:  Supple,  no bruit  Lungs:            Clear to auscultation bilaterally, no wheezes rhonchi rales are noted  Chest wall: No tenderness  Heart::  Regular rate and rhythm, S1 and S2 normal, no murmur, rub or gallop  Abdomen: Soft, nontender, nondistended bowel sounds active  Extremities: No cyanosis, clubbing.  1+ edema  Pulses:            2+ and symmetric all extremities  Skin:  No rashes or lesions  Neuro/psych: A&O x3. CN II through XII are grossly " intact with appropriate affect

## 2024-10-30 NOTE — PLAN OF CARE
Goal Outcome Evaluation: Pt has been up ambulating in hallway and to see NB in NICU frequently, pt has tolerated well. Pain has been manageable with routine med as scheduled. Pt had echo performed and discharge on hold for tonight, possible discharge tomorrow.

## 2024-10-30 NOTE — PLAN OF CARE
Goal Outcome Evaluation:  Plan of Care Reviewed With: patient           Outcome Evaluation: Pt up ad raman & voiding qs. Pt ambulating to NICU to visit with infant. Pt is pumping & doing well.

## 2024-10-30 NOTE — LACTATION NOTE
This note was copied from a baby's chart.  Pt's room visited, supplies checked, needed parts provided, takes pump kit parts for microsteaming in nicu. Questions answered for pump to get for home. Will call for help as needed.

## 2024-10-30 NOTE — NURSING NOTE
Pt returned from Echo, had ambulated to NICU to see NB and returned to bedside. Pt has been very anxious today, appropriate and pleasant. Pain has been manageable.

## 2024-10-30 NOTE — PROGRESS NOTES
CAMILLA Oliveros  Postpartum Note    Subjective   Postpartum Day 4:  Primary Low Transverse  Section    Patient without complaints. Her pain is well controlled with nonsteroidal anti-inflammatory drugs and prescribed pain medications. She is ambulating well.  Patient describes her bleeding as thin lochia.  Lochia appropriate for PP period.  Patient voiding without difficulty and ambulating without assistance.  Hgb stable and patient remains asymptomatic.  Patient BP hypertensive and Labetalol 200mg increased to TID yesterday.  Previous c/o headache yesterday and states significant improvement following blood patch.  Good output noted and continued +1 BLE.  Patient highly anxious and emotional due to infant in NICU.  Continued support provided.    Breastfeeding: has not attempted yet.    Objective     Vitals:  Vitals:    10/30/24 0008 10/30/24 0742 10/30/24 0807 10/30/24 0829   BP: 149/88 (!) 162/105 147/98 158/95   BP Location: Right arm Right arm     Patient Position: Sitting Sitting     Pulse: 52 81     Resp: 18 19     Temp: 97.9 °F (36.6 °C) 98.4 °F (36.9 °C)     TempSrc: Oral Oral     SpO2: 97% 99%     Weight:       Height:           Physical Exam:  General:  Alert and oriented x3. No acute distress.  Abdomen: abdomen is soft without significant tenderness, masses, organomegaly or guarding. Fundus: appropriate, firm, non tender  Incision: Clean/Dry/Intact, Sutures intact, and steri strips in place  Skin: Warm, Dry  Extremities: Normal,  trace edema. Nontender     Labs:  Results from last 7 days   Lab Units 10/29/24  0930 10/27/24  0340 10/26/24  0151   WBC 10*3/mm3 9.53 19.11* 13.67*   HEMOGLOBIN g/dL 10.4* 11.7* 13.6   HEMATOCRIT % 32.3* 34.3 40.6   PLATELETS 10*3/mm3 156 215 238     Results from last 7 days   Lab Units 10/26/24  0151   GLUCOSE mg/dL 114*        Feeding method: Breastfeeding Status: Yes     Blood Type: RH Positive        Assessment & Plan     Principal Problem:    Preeclampsia  Active  Problems:     delivery delivered      Lynn Soto is Day 4  post-partum from a  Primary Low Transverse  Section      Plan:  routine, continue present management, encourage ambulation, monitor BPs, monitor pain, and appreciate input from consulting physicians.  Cardiology consult recommended and continued BP monitoring.  Consulted with Dr. Le and rec continued Labetalol 200mg po TID and cards consult for further recc's.       Kajal Ba, NICHOLAS  10/30/2024  09:35 EDT

## 2024-10-30 NOTE — LACTATION NOTE
This note was copied from a baby's chart.  Mother called LC to room for advise on nipple discomfort and a small blister. Nipple skin care started and toñito, instructed, will continue

## 2024-10-31 VITALS
WEIGHT: 277 LBS | SYSTOLIC BLOOD PRESSURE: 153 MMHG | DIASTOLIC BLOOD PRESSURE: 95 MMHG | OXYGEN SATURATION: 98 % | BODY MASS INDEX: 41.98 KG/M2 | HEART RATE: 76 BPM | HEIGHT: 68 IN | TEMPERATURE: 98.4 F | RESPIRATION RATE: 19 BRPM

## 2024-10-31 PROBLEM — O14.03 MILD PREECLAMPSIA, THIRD TRIMESTER: Status: RESOLVED | Noted: 2024-10-16 | Resolved: 2024-10-31

## 2024-10-31 LAB
ANION GAP SERPL CALCULATED.3IONS-SCNC: 11.2 MMOL/L (ref 5–15)
BUN SERPL-MCNC: 17 MG/DL (ref 6–20)
BUN/CREAT SERPL: 26.6 (ref 7–25)
CALCIUM SPEC-SCNC: 10 MG/DL (ref 8.6–10.5)
CHLORIDE SERPL-SCNC: 104 MMOL/L (ref 98–107)
CO2 SERPL-SCNC: 24.8 MMOL/L (ref 22–29)
CREAT SERPL-MCNC: 0.64 MG/DL (ref 0.57–1)
EGFRCR SERPLBLD CKD-EPI 2021: 127.5 ML/MIN/1.73
GLUCOSE SERPL-MCNC: 82 MG/DL (ref 65–99)
POTASSIUM SERPL-SCNC: 4.3 MMOL/L (ref 3.5–5.2)
SODIUM SERPL-SCNC: 140 MMOL/L (ref 136–145)

## 2024-10-31 PROCEDURE — 80048 BASIC METABOLIC PNL TOTAL CA: CPT | Performed by: INTERNAL MEDICINE

## 2024-10-31 PROCEDURE — 99232 SBSQ HOSP IP/OBS MODERATE 35: CPT | Performed by: INTERNAL MEDICINE

## 2024-10-31 PROCEDURE — 25010000002 FUROSEMIDE PER 20 MG: Performed by: INTERNAL MEDICINE

## 2024-10-31 PROCEDURE — 99222 1ST HOSP IP/OBS MODERATE 55: CPT

## 2024-10-31 RX ORDER — LABETALOL 200 MG/1
400 TABLET, FILM COATED ORAL EVERY 12 HOURS SCHEDULED
Status: DISCONTINUED | OUTPATIENT
Start: 2024-10-31 | End: 2024-11-01 | Stop reason: HOSPADM

## 2024-10-31 RX ORDER — LABETALOL 200 MG/1
400 TABLET, FILM COATED ORAL 2 TIMES DAILY
Qty: 120 TABLET | Refills: 2 | Status: SHIPPED | OUTPATIENT
Start: 2024-10-31

## 2024-10-31 RX ORDER — LABETALOL 200 MG/1
400 TABLET, FILM COATED ORAL 2 TIMES DAILY
Qty: 120 TABLET | Refills: 5 | Status: SHIPPED | OUTPATIENT
Start: 2024-10-31

## 2024-10-31 RX ORDER — SERTRALINE HYDROCHLORIDE 25 MG/1
25 TABLET, FILM COATED ORAL DAILY
Qty: 30 TABLET | Refills: 1 | Status: SHIPPED | OUTPATIENT
Start: 2024-10-31 | End: 2025-10-31

## 2024-10-31 RX ORDER — LABETALOL 200 MG/1
200 TABLET, FILM COATED ORAL 3 TIMES DAILY
Qty: 90 TABLET | Refills: 1 | Status: SHIPPED | OUTPATIENT
Start: 2024-10-31 | End: 2024-10-31 | Stop reason: HOSPADM

## 2024-10-31 RX ADMIN — ACETAMINOPHEN 650 MG: 325 TABLET, FILM COATED ORAL at 21:17

## 2024-10-31 RX ADMIN — IBUPROFEN 600 MG: 600 TABLET, FILM COATED ORAL at 16:55

## 2024-10-31 RX ADMIN — ACETAMINOPHEN 650 MG: 325 TABLET, FILM COATED ORAL at 13:59

## 2024-10-31 RX ADMIN — OXYCODONE 5 MG: 5 TABLET ORAL at 14:10

## 2024-10-31 RX ADMIN — ACETAMINOPHEN 650 MG: 325 TABLET, FILM COATED ORAL at 08:03

## 2024-10-31 RX ADMIN — DOCUSATE SODIUM 100 MG: 100 CAPSULE, LIQUID FILLED ORAL at 08:03

## 2024-10-31 RX ADMIN — LABETALOL HYDROCHLORIDE 200 MG: 200 TABLET, FILM COATED ORAL at 08:03

## 2024-10-31 RX ADMIN — IBUPROFEN 600 MG: 600 TABLET, FILM COATED ORAL at 11:23

## 2024-10-31 RX ADMIN — PRENATAL VITAMINS-IRON FUMARATE 27 MG IRON-FOLIC ACID 0.8 MG TABLET 1 TABLET: at 21:17

## 2024-10-31 RX ADMIN — IBUPROFEN 600 MG: 600 TABLET, FILM COATED ORAL at 00:33

## 2024-10-31 RX ADMIN — DOCUSATE SODIUM 100 MG: 100 CAPSULE, LIQUID FILLED ORAL at 21:17

## 2024-10-31 RX ADMIN — IBUPROFEN 600 MG: 600 TABLET, FILM COATED ORAL at 05:42

## 2024-10-31 RX ADMIN — FUROSEMIDE 20 MG: 10 INJECTION, SOLUTION INTRAMUSCULAR; INTRAVENOUS at 00:25

## 2024-10-31 RX ADMIN — ACETAMINOPHEN 650 MG: 325 TABLET, FILM COATED ORAL at 02:00

## 2024-10-31 RX ADMIN — IBUPROFEN 600 MG: 600 TABLET, FILM COATED ORAL at 23:10

## 2024-10-31 NOTE — NURSING NOTE
Pt ambulated back to room from NICU, pt states that she has a headache, pt states that it is in the back of her head and neck, pt encouraged to rest, placed a cold pack to back of her neck and will re-evaluate her B/P's and pain in 30 minutes.
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
## 2024-10-31 NOTE — PLAN OF CARE
Goal Outcome Evaluation:   Pt being discharged, will border.

## 2024-10-31 NOTE — DISCHARGE SUMMARY
[facility]  Delivery Discharge Summary    Primary OB Clinician: Jessenia Le MD    Admission Diagnosis:  Principal Problem:    Preeclampsia  33w3d IUP  C/S for FIOL    Discharge Diagnosis:  Same, delivered    Gestational Age: 33w4d    Date of Delivery: 10/26/2024    Delivered By:  Ashli Frances    Delivery Type: , Low Transverse     Tubal Ligation: n/a    Intrapartum Course: Uncomplicated delivery.     Postpartum Course:  Pt was admitted and underwent  Primary Low Transverse  Section for fetal intolerance to labor and preeclampsia. Pt was transferred to  where she had an uncomplicated course. Pt remained AFVSS, had scant lochia and pain was well controlled. Blood pressures have been elevated pp 150/90s. Labetalol 200mg increased from bid to tid and cardiology was consulted. ECHO was done yesterday and one dose of Lasix was ordered. Pt ambulating and diuresing better today. Denies headache, visual changes, or RUQ pain. BP improving on Labetalol TID. Plan to monitor outpt with bp check next week. Pt d/c home in stable condition pending cardiology and psych clearance. Currently pumping for infant in NICU. Plans on condoms  for contraception. Pt voiding /s difficulty and passing flatus. Pt c/o anxiety and feeling overwhelmed with having a premature infant in the NICU. Denies JEREMI/HI. Appreciate psych consult.     Physical Exam:    Vitals:   Vitals:    10/31/24 0033 10/31/24 0348 10/31/24 0711 10/31/24 0832   BP: 142/79 159/88 143/85 156/90   BP Location: Left arm Right arm Right arm Right arm   Patient Position: Sitting Lying Sitting Lying   Pulse: 57 59 75 73   Resp: 18 18 18    Temp: 98 °F (36.7 °C) 97.7 °F (36.5 °C) 97.9 °F (36.6 °C)    TempSrc: Oral Oral Oral    SpO2: 98% 97% 98% 100%   Weight:       Height:         Temp (24hrs), Av.9 °F (36.6 °C), Min:97.7 °F (36.5 °C), Max:98.1 °F (36.7 °C)      General Appearance:    Alert, cooperative, in no acute distress   Abdomen:     Soft  non-tender, non-distended, no guarding, no rebound         tenderness.   Extremities:   Moves all extremities well, no edema, no cyanosis, no              Redness.   Incision:   Clean, dry, intact   Fundus:   Firm, below umbilicus     Feeding method: Breastfeeding Status: Yes    Labs:  Results from last 7 days   Lab Units 10/29/24  0930 10/27/24  0340 10/26/24  0151   WBC 10*3/mm3 9.53 19.11* 13.67*   HEMOGLOBIN g/dL 10.4* 11.7* 13.6   HEMATOCRIT % 32.3* 34.3 40.6   PLATELETS 10*3/mm3 156 215 238     Results from last 7 days   Lab Units 10/31/24  0555   GLUCOSE mg/dL 82       Blood Type: RH Positive      Plan:  Discharge to home pending cardiology and psych consults.   Continue FeSO4 bid   Continue Labetalol 200mg TID  To call if bp >150/100, back to F if bp >160/110, persistent headache, visual changes, or RUQ pain  Appreciate cardiology and psych consults  Follow-up appointment with Dr Le in 5 days for bp check and in 6 weeks pp check.  All discharge instructions were reviewed with pt including bleeding warnings, s/sx of pp depression, and warning signs in the pp period for which to seek medical attention including but not limited to s/sx of hypertension and thromboembolism.         Esmer Kimbrough, APRN  10/31/2024  11:40 EDT      /d

## 2024-10-31 NOTE — PROGRESS NOTES
"Cardiology Progress  Note      Patient Care Team:  Anne-Marie Ibarra APRN as PCP - General (Nurse Practitioner)    PATIENT IDENTIFICATION  Name: Lynn Soto  Age: 23 y.o.  Sex: female  :  2000  MRN: [id number]      Length of stay:    LOS: 6 days           REASON FOR FOLLOW-UP:  Preeclampsia  Elevated blood pressure      INTERVAL HISTORY  Patient seen and examined, chart and labs reviewed.  She is lying in bed asleep upon entry.  She reports feeling better today.  Has had good urine output with decreased swelling of her lower extremities.  She denies any shortness of breath or chest discomfort.      SUBJECTIVE    No chest discomfort  No shortness of breath  No headache  No vision changes  Decreased lower extremity edema    REVIEW OF SYSTEMS:  Pertinent items are noted in HPI, all other systems reviewed and negative    OBJECTIVE   Labs reviewed and unremarkable    ASSESSMENT  Preeclampsia   delivery of female infant  Lower extremity edema  Elevated blood pressures      RECOMMENDATIONS  Blood pressure significantly improved, still somewhat suboptimal.  Will increase labetalol to 400 mg twice daily.  She received 1 dose IV Lasix with good diuresis.  Electrolytes stable.  2D echocardiogram with normal LV systolic function and no significant valvular dysfunction.  Some left atrial enlargement possibly due to increased vascular fluid.  Patient CV status stable for discharge when okay with obstetrician.  Follow-up in our office 2-3 weeks          CHF Guideline Directed Medical Therapy  Beta Blocker:   ARNI/ACE/ARB:   SGLT 2 inhibitors:   Diuretics:   Aldosterone Antagonist:   Vasodilators & Nitrates:       Vital Signs  Visit Vitals  /86 (BP Location: Right arm, Patient Position: Lying)   Pulse 70   Temp 98.7 °F (37.1 °C) (Oral)   Resp 16   Ht 172.7 cm (68\")   Wt 126 kg (277 lb)   LMP 2024 (Exact Date)   SpO2 96%   Breastfeeding Yes   BMI 42.12 kg/m²     Oxygen Therapy  SpO2: 96 %  Pulse " "Oximetry Type: Intermittent  Device (Oxygen Therapy): room air  Flowsheet Rows      Flowsheet Row First Filed Value   Admission Height 172.7 cm (68\") Documented at 10/25/2024 0815   Admission Weight 131 kg (289 lb 7.4 oz) Documented at 10/25/2024 0815          Intake & Output (last 3 days)         10/28 0701  10/29 0700 10/29 0701  10/30 0700 10/30 0701  10/31 0700 10/31 0701 11/01 0700    P.O. 1200 2180 2997     I.V. (mL/kg)        Total Intake(mL/kg) 1200 (9.3) 2180 (17) 2997 (23.8)     Urine (mL/kg/hr) 4700 (1.5) 3975 (1.3) 4950 (1.6) 400 (0.4)    Stool 0       Total Output 4700 3975 4950 400    Net -3500 -1795 -1953 -400            Stool Unmeasured Occurrence 1 x             Lines, Drains & Airways       Active LDAs       Name Placement date Placement time Site Days    Peripheral IV 10/30/24 2150 Left;Posterior Hand 10/30/24  2150  Hand  less than 1                           /86 (BP Location: Right arm, Patient Position: Lying)   Pulse 70   Temp 98.7 °F (37.1 °C) (Oral)   Resp 16   Ht 172.7 cm (68\")   Wt 126 kg (277 lb)   LMP 03/05/2024 (Exact Date)   SpO2 96%   Breastfeeding Yes   BMI 42.12 kg/m²   Intake/Output last 3 shifts:  I/O last 3 completed shifts:  In: 3977 [P.O.:3977]  Out: 6750 [Urine:6750]  Intake/Output this shift:  I/O this shift:  In: -   Out: 400 [Urine:400]    PHYSICAL EXAM:    General: Alert, cooperative, no distress, appears stated age  Head:  Normocephalic, atraumatic, mucous membranes moist  Eyes:  Conjunctivae/corneas clear, EOM's intact     Neck:  Supple,  no adenopathy; no JVD or bruit  Lungs:  Clear to auscultation bilaterally, no wheezes, rhonchi or rales are noted  Chest wall: No tenderness  Heart::  Regular rate and rhythm, S1 and S2 normal, no murmur, rub or gallop  Abdomen: Soft, nontender, nondistended, bowel sounds active  Extremities: No cyanosis, clubbing, or edema   Pulses: 2+ and symmetric all extremities  Skin:  No rashes or lesions  Neuro/psych: A&O x3. CN " "II through XII are grossly intact with appropriate affect        Scheduled Meds:      acetaminophen, 650 mg, Oral, Q6H  docusate sodium, 100 mg, Oral, BID  ibuprofen, 600 mg, Oral, Q6H  labetalol, 400 mg, Oral, Q12H  prenatal vitamin 27-0.8, 1 tablet, Oral, Nightly        Continuous Infusions:         PRN Meds:      diphenhydrAMINE **OR** diphenhydrAMINE **OR** diphenhydrAMINE    guaiFENesin    HYDROmorphone    oxyCODONE **OR** oxyCODONE    oxytocin    simethicone        Results Review:     I reviewed the patient's new clinical results.    CBC    Results from last 7 days   Lab Units 10/29/24  0930 10/27/24  0340 10/26/24  0151 10/25/24  1713 10/25/24  0940   WBC 10*3/mm3 9.53 19.11* 13.67* 12.48* 10.80   HEMOGLOBIN g/dL 10.4* 11.7* 13.6 13.4 12.1   PLATELETS 10*3/mm3 156 215 238 218 220     Cr Clearance Estimated Creatinine Clearance: 191.4 mL/min (by C-G formula based on SCr of 0.64 mg/dL).  Coag     HbA1C No results found for: \"HGBA1C\"  Blood Glucose No results found for: \"POCGLU\"  Infection     CMP   Results from last 7 days   Lab Units 10/31/24  0555 10/26/24  0151 10/25/24  1713 10/25/24  0940   SODIUM mmol/L 140 131* 135* 138   POTASSIUM mmol/L 4.3 4.9 4.6 4.3   CHLORIDE mmol/L 104 101 104 107   CO2 mmol/L 24.8 19.3* 19.4* 20.6*   BUN mg/dL 17 15 14 14   CREATININE mg/dL 0.64 0.65 0.69 0.68   GLUCOSE mg/dL 82 114* 108* 71   ALBUMIN g/dL  --  3.2* 3.2* 3.1*   BILIRUBIN mg/dL  --  0.3 0.2 0.2   ALK PHOS U/L  --  148* 151* 135*   AST (SGOT) U/L  --  50* 50* 45*   ALT (SGPT) U/L  --  53* 49* 43*     ABG      UA      HOA  No results found for: \"POCMETH\", \"POCAMPHET\", \"POCBARBITUR\", \"POCBENZO\", \"POCCOCAINE\", \"POCOPIATES\", \"POCOXYCODO\", \"POCPHENCYC\", \"POCPROPOXY\", \"POCTHC\", \"POCTRICYC\"  Lysis Labs   Results from last 7 days   Lab Units 10/31/24  0555 10/29/24  0930 10/27/24  0340 10/26/24  0151 10/25/24  1713 10/25/24  0940   HEMOGLOBIN g/dL  --  10.4* 11.7* 13.6 13.4 12.1   PLATELETS 10*3/mm3  --  156 215 238 218 " 220   CREATININE mg/dL 0.64  --   --  0.65 0.69 0.68     Radiology(recent) No radiology results for the last day            X-rays, labs reviewed personally by physician.    ECG/EMG Results (most recent)       Procedure Component Value Units Date/Time    Adult Transthoracic Echo Complete W/ Cont if Necessary Per Protocol [[id number]] Resulted: 10/30/24 2047     Updated: 10/30/24 2047     LVIDd 5.0 cm      LVIDs 3.3 cm      IVSd 1.10 cm      LVPWd 1.10 cm      FS 34.0 %      IVS/LVPW 1.00 cm      ESV(cubed) 35.9 ml      EDV(cubed) 125.0 ml      LV mass(C)d 207.1 grams      LVOT area 3.5 cm2      LVOT diam 2.10 cm      EDV(MOD-sp4) 133.0 ml      ESV(MOD-sp4) 41.2 ml      SV(MOD-sp4) 91.8 ml      EF(MOD-sp4) 69.0 %      MV E max jean 135.0 cm/sec      MV A max jean 119.0 cm/sec      MV dec time 0.19 sec      MV E/A 1.13     Med Peak E' Jean 10.8 cm/sec      Lat Peak E' Jean 15.6 cm/sec      TR max jean 238.0 cm/sec      Avg E/e' ratio 10.23     SV(LVOT) 101.5 ml      SV(RVOT) 101.5 ml      Qp/Qs 1.00     RVIDd 2.40 cm      TAPSE (>1.6) 2.9 cm      RV S' 15.1 cm/sec      LA dimension (2D)  4.9 cm      LV V1 max 131.0 cm/sec      LV V1 max PG 6.9 mmHg      LV V1 mean PG 3.0 mmHg      LV V1 VTI 29.3 cm      Ao pk jean 179.0 cm/sec      Ao max PG 12.8 mmHg      Ao mean PG 6.0 mmHg      Ao V2 VTI 42.1 cm      ALFONSO(I,D) 2.41 cm2      MV max PG 11.6 mmHg      MV mean PG 3.0 mmHg      MV V2 VTI 37.5 cm      MV P1/2t 72.6 msec      MVA(P1/2t) 3.0 cm2      MVA(VTI) 2.7 cm2      MV dec slope 722.5 cm/sec2      MR max jean 376.0 cm/sec      MR max PG 56.6 mmHg      TR max PG 22.7 mmHg      RVOT diam 2.10 cm      RV V1 max PG 5.1 mmHg      RV V1 max 113.0 cm/sec      RV V1 VTI 29.3 cm      PA V2 max 136.0 cm/sec      ACS 2.10 cm      Sinus 2.6 cm      Dimensionless Index 0.73 (DI)     Narrative:        Left ventricular systolic function is normal. Left ventricular ejection   fraction appears to be 61 - 65%.    Left ventricular wall  "thickness is consistent with mild concentric   hypertrophy.    Left ventricular diastolic function was normal.    The left atrial cavity is moderate to severely dilated.                Medication Review:   I have reviewed the patient's current medication list  Scheduled Meds:acetaminophen, 650 mg, Oral, Q6H  docusate sodium, 100 mg, Oral, BID  ibuprofen, 600 mg, Oral, Q6H  labetalol, 400 mg, Oral, Q12H  prenatal vitamin 27-0.8, 1 tablet, Oral, Nightly      Continuous Infusions:   PRN Meds:.  diphenhydrAMINE **OR** diphenhydrAMINE **OR** diphenhydrAMINE    guaiFENesin    HYDROmorphone    oxyCODONE **OR** oxyCODONE    oxytocin    simethicone    Imaging:  Imaging Results (Last 72 Hours)       ** No results found for the last 72 hours. **              NICHOLAS Puentes  10/31/24  14:43 EDT       EMR Dragon/Transcription:   \"Dictated utilizing Dragon dictation\".       Electronically signed by NICHOLAS Puentes, 10/31/24, 2:43 PM EDT.  Copied text in this note has been reviewed by me and is accurate as of 10/31/24.    Cardiology attending:  Seen and examined.  Chart and labs reviewed. Independent interpretations of cardiac testing was performed. History and exam findings are verified with above changes noted.  Assessment and plan notated by APC after being formulated by attending consultant.  Note that greater than 50% of the time spent in care of the patient was provided by attending consultant.    Patient denies any chest pain pressure heaviness or tightness.  No new issues.  Blood pressure still slightly elevated.  She is currently on labetalol 200 mg 3 times daily for a total daily dose of 600 mg.  We will change this to 400 mg twice daily for a total daily dose of 800 mg to try and range and the blood pressure a little better.  She does have a little bit less prominent edema after receiving the diuretic.  Patient is otherwise appropriate from a cardiac perspective to discharge home.  Would recommend " follow-up in 2 to 3 weeks in the office for assessment of blood pressures    Physical Exam:    General: Alert, cooperative, no distress, appears stated age  Head:  Normocephalic, atraumatic, mucous membranes moist  Eyes:  Conjunctivae/corneas clear, EOM's intact     Neck:  Supple,  no bruit  Lungs:            Clear to auscultation bilaterally, no wheezes rhonchi rales are noted  Chest wall: No tenderness  Heart::  Regular rate and rhythm, S1 and S2 normal, 1/6 holosystolic murmur.  No rub or gallop  Abdomen: Soft, nontender, nondistended bowel sounds active.  Obese  Extremities: No cyanosis, clubbing.  Trace edema  Pulses:            2+ and symmetric all extremities  Skin:  No rashes or lesions  Neuro/psych: A&O x3. CN II through XII are grossly intact with appropriate affect

## 2024-10-31 NOTE — LACTATION NOTE
This note was copied from a baby's chart.  Pt met in nicu, scheduled to work with OT on baby care, needed supplies placed in room, microsteams pump kit parts in nicu. Will call for help as needed.

## 2024-10-31 NOTE — PLAN OF CARE
Goal Outcome Evaluation:  Plan of Care Reviewed With: patient        Progress: improving  Outcome Evaluation: Pt up voiding very well after Lasix. Pt ambulating to NICU. Pt's pain controlled with scheduled medications.

## 2024-11-01 NOTE — PROGRESS NOTES
Case Management Discharge Note                Selected Continued Care - Discharged on 10/31/2024 Admission date: 10/25/2024 - Discharge disposition: Home or Self Care      Destination    No services have been selected for the patient.                Durable Medical Equipment    No services have been selected for the patient.                Dialysis/Infusion    No services have been selected for the patient.                Home Medical Care    No services have been selected for the patient.                Therapy    No services have been selected for the patient.                Community Resources    No services have been selected for the patient.                Community & DME    No services have been selected for the patient.                         Final Discharge Disposition Code: 01 - home or self-care

## 2024-11-04 ENCOUNTER — LACTATION ENCOUNTER (OUTPATIENT)
Dept: OTHER | Facility: HOSPITAL | Age: 24
End: 2024-11-04

## 2024-11-05 ENCOUNTER — LACTATION ENCOUNTER (OUTPATIENT)
Dept: OTHER | Facility: HOSPITAL | Age: 24
End: 2024-11-05

## 2024-11-05 NOTE — LACTATION NOTE
This note was copied from a baby's chart.  Pt seen today, reports she is doing well, continues pumping well, questions answered on managing pumping at home and bringing EBM to the hospital. Needed supplies provided, will call for help as needed.

## 2024-11-07 ENCOUNTER — LACTATION ENCOUNTER (OUTPATIENT)
Dept: OTHER | Facility: HOSPITAL | Age: 24
End: 2024-11-07

## 2024-11-07 NOTE — LACTATION NOTE
This note was copied from a baby's chart.  Mother met in nicu, assisted to latch baby using nipple shield, verbal guidance done, baby latches, suckles intermittently x 10 min. then falls asleep. Remaining feeding done via gavage by nicu RN. Needed supplies provided.  Bring pump kit parts to nicu for microsteaming.    20.8

## 2024-11-08 ENCOUNTER — LACTATION ENCOUNTER (OUTPATIENT)
Dept: OTHER | Facility: HOSPITAL | Age: 24
End: 2024-11-08

## 2024-11-08 NOTE — LACTATION NOTE
This note was copied from a baby's chart.  Mother continues pumping, feeds baby as per care plan, needed supplies provided, takes pump kit parts to microsteam in nicu.

## 2024-11-09 ENCOUNTER — LACTATION ENCOUNTER (OUTPATIENT)
Dept: OTHER | Facility: HOSPITAL | Age: 24
End: 2024-11-09

## 2024-11-09 NOTE — LACTATION NOTE
This note was copied from a baby's chart.  Mother states that she has decided that she will pump and feed. Now has 2 pumps: Spectra and hands-free Mom Junior. Reports that she is pumping for 20mins every 3hrs and gets 80 to 110mls. Nipples noted to have dry areas on sides, reinforced nipple care. Denies lactation needs at this time.

## 2024-11-10 ENCOUNTER — LACTATION ENCOUNTER (OUTPATIENT)
Dept: OTHER | Facility: HOSPITAL | Age: 24
End: 2024-11-10

## 2024-11-10 NOTE — LACTATION NOTE
This note was copied from a baby's chart.  Mother in NICU with baby. States that she had previously not routinely pumping and wanted to be honest. She reports that she does not want to lose her volume, so she will routinely pump now. Praised honesty and courage. Yesterday when nipples were noted to be dry, I had her stop using the breast pads she had been using from home and provided her with a box of Curad from here. Nipples look improved today. Mom said that the nipples are feeling better. Denies further lactation needs at this time. Encouraged to call as needed.

## 2024-11-11 ENCOUNTER — LACTATION ENCOUNTER (OUTPATIENT)
Dept: OTHER | Facility: HOSPITAL | Age: 24
End: 2024-11-11

## 2024-11-12 ENCOUNTER — MATERNAL SCREENING (OUTPATIENT)
Dept: CALL CENTER | Facility: HOSPITAL | Age: 24
End: 2024-11-12
Payer: COMMERCIAL

## 2024-11-12 ENCOUNTER — LACTATION ENCOUNTER (OUTPATIENT)
Dept: OTHER | Facility: HOSPITAL | Age: 24
End: 2024-11-12

## 2024-11-12 NOTE — OUTREACH NOTE
Maternal Screening Survey      Flowsheet Row Responses   Facility patient discharged from? [facility]   Attempt successful? No   Unsuccessful attempts Attempt 1  [Baby in NICU]              Scott MAGALLANES - Registered Nurse          
Current every day smoker

## 2024-11-12 NOTE — LACTATION NOTE
This note was copied from a baby's chart.  Mom not seen today, Per NICU staff mom continues pumping.

## 2024-11-12 NOTE — OUTREACH NOTE
Maternal Screening Survey      Flowsheet Row Responses   Facility patient discharged from? [facility]   Attempt successful? Yes   Call start time 1229   Call end time 1231   Person spoke with today (if not patient) and relationship patient   I have been able to laugh and see the funny side of things. 0   I have looked forward with enjoyment to things. 0   I have blamed myself unnecessarily when things went wrong. 0   I have been anxious or worried for no good reason. 0   I have felt scared or panicky for no good reason. 0   Things have been getting on top of me. 0   I have been so unhappy that I have had difficulty sleeping. 0   I have felt sad or miserable. 0   I have been so unhappy that I have been crying. 0   The thought of harming myself has occurred to me. 0   [place]  Depression Scale Total 0   Did any of your parents have problems with alcohol or drug use? No   Do any of your peers have problems with alcohol or drug use? No   Does your partner have problems with alcohol or drug use? No   Before you were pregnant did you have problems with alcohol or drug use? (past) No   In the past month, did you drink beer, wine, liquor or use any other drugs? (pregnancy) No   Maternal Screening call completed Yes   Call end time 1231              Scott MAGALLANES - Registered Nurse

## 2024-11-13 ENCOUNTER — LACTATION ENCOUNTER (OUTPATIENT)
Dept: OTHER | Facility: HOSPITAL | Age: 24
End: 2024-11-13

## 2024-11-13 NOTE — LACTATION NOTE
This note was copied from a baby's chart.  Mom met in her room, states she continues to pump, needed supplies provided, takes pump kit parts to nicu for microsteaming once a day,

## 2024-11-14 ENCOUNTER — LACTATION ENCOUNTER (OUTPATIENT)
Dept: OTHER | Facility: HOSPITAL | Age: 24
End: 2024-11-14

## 2024-11-14 NOTE — LACTATION NOTE
This note was copied from a baby's chart.  Mom seen in nicu visiting in nicu, states she went to OB office visit, had breasts/ nipple assessed for thrush as baby has been diagnosed with that. Denies any symptoms, advised to continue pumping regularly.

## 2024-11-17 ENCOUNTER — LACTATION ENCOUNTER (OUTPATIENT)
Dept: OTHER | Facility: HOSPITAL | Age: 24
End: 2024-11-17

## 2024-11-17 NOTE — LACTATION NOTE
This note was copied from a baby's chart.  Parents in to visit with baby this afternoon. Mother reports she pumps Q3hrs X20 mins for 120 to 160mls of milk. Nipples have improved. Provided with needed supplies. Encouraged to call as needed.

## 2024-11-18 ENCOUNTER — LACTATION ENCOUNTER (OUTPATIENT)
Dept: OTHER | Facility: HOSPITAL | Age: 24
End: 2024-11-18

## 2024-11-18 NOTE — LACTATION NOTE
This note was copied from a baby's chart.  Mother at bedside in NICU holding baby. Continues to pump and feed. Denies lactation needs/questions. Encouraged to call as needed.

## 2024-11-19 ENCOUNTER — LACTATION ENCOUNTER (OUTPATIENT)
Dept: OTHER | Facility: HOSPITAL | Age: 24
End: 2024-11-19

## 2024-11-19 NOTE — LACTATION NOTE
This note was copied from a baby's chart.  Parents met at end of nicu visit. Reports she continues pumping well regularly, has frozen some milk and bring more to nicu. Denies any lactation questions or needs. Will call as needed.

## 2024-11-20 ENCOUNTER — LACTATION ENCOUNTER (OUTPATIENT)
Dept: OTHER | Facility: HOSPITAL | Age: 24
End: 2024-11-20

## 2024-11-20 NOTE — LACTATION NOTE
This note was copied from a baby's chart.  Pt visited in her room, continues pumping to provide her milk for baby in nicu, provided needed supplies, will call for help as needed.

## 2024-11-21 ENCOUNTER — LACTATION ENCOUNTER (OUTPATIENT)
Dept: OTHER | Facility: HOSPITAL | Age: 24
End: 2024-11-21

## 2024-11-21 NOTE — LACTATION NOTE
This note was copied from a baby's chart.  Mom seen in nicu. Continues p&f. Needed supplies checked. Will call as needed.

## 2024-11-23 ENCOUNTER — LACTATION ENCOUNTER (OUTPATIENT)
Dept: OTHER | Facility: HOSPITAL | Age: 24
End: 2024-11-23

## 2024-11-23 NOTE — LACTATION NOTE
This note was copied from a baby's chart.  Parents here with baby. Mother reports she continues to pump every 3 hrs for 80plus mls each time. Provided with needed supplies. Denies further lactation needs/supplies at this time. To call as needed.

## 2024-11-24 ENCOUNTER — LACTATION ENCOUNTER (OUTPATIENT)
Dept: OTHER | Facility: HOSPITAL | Age: 24
End: 2024-11-24

## 2024-11-24 NOTE — LACTATION NOTE
This note was copied from a baby's chart.  Parents here caring for baby and anticipate discharge today. Pumping and feeding continues about the same. Denies lactation needs/questions. Discussed first night at home. Provided with  discharge weight ticket and lactation contact card. Encouraged to call as needed.

## 2024-12-02 ENCOUNTER — OFFICE VISIT (OUTPATIENT)
Dept: CARDIOLOGY | Facility: CLINIC | Age: 24
End: 2024-12-02
Payer: COMMERCIAL

## 2024-12-02 VITALS
WEIGHT: 263 LBS | DIASTOLIC BLOOD PRESSURE: 74 MMHG | OXYGEN SATURATION: 98 % | BODY MASS INDEX: 39.86 KG/M2 | SYSTOLIC BLOOD PRESSURE: 111 MMHG | HEART RATE: 76 BPM | HEIGHT: 68 IN

## 2024-12-02 DIAGNOSIS — R03.0 ELEVATED BLOOD PRESSURE READING: ICD-10-CM

## 2024-12-02 DIAGNOSIS — O14.93 PRE-ECLAMPSIA IN THIRD TRIMESTER: ICD-10-CM

## 2024-12-02 DIAGNOSIS — I43 CARDIOMYOPATHY, HYPERTENSIVE, WITHOUT HEART FAILURE: Primary | ICD-10-CM

## 2024-12-02 DIAGNOSIS — I11.9 CARDIOMYOPATHY, HYPERTENSIVE, WITHOUT HEART FAILURE: Primary | ICD-10-CM

## 2024-12-02 NOTE — PROGRESS NOTES
[facility] CARDIOLOGY      REASON FOR FOLLOW-UP:  Follow-up hospitalization          Chief Complaint   Patient presents with    Heart Problem         Dear Anne-Marie Ibarra APRN        History of Present Illness   Was my pleasure to see Lynn Soto in the office today.  She is a 24-year-old female with no significant past cardiac history.  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  The patient was evaluated at [facility] 10/30/2024 6 days postpartum  section with lower extremity edema and elevated blood pressure.  She had been started on labetalol prepartum with dose frequency increased to 200 mg 3 times daily.  Cardiology evaluated the patient.  TTE performed showed normal LV systolic function with EF 61-65%, mild concentric LVH, left atrial cavity was moderate to severely dilated.  She was discharged home on labetalol dosing as above.  She presents today in follow-up from that hospitalization.    Today, Lynn reports that she feels very well.  Her baby is now home with her and her .  She denies any chest discomfort, shortness of breath, no further lower extremity edema.  She reports 2 days ago she had an episode of dizziness throughout the day, but denies any near syncopal or syncopal episodes.  She took her blood pressure and reports it was in the 120s over 70.  Due to some ongoing dizziness, she did discontinue the labetalol about 24 hours ago.  Her blood pressure in the office today is well-controlled at 111/74.      ASSESSMENT:  Preeclampsia   delivery of female infant  Lower extremity edema  Elevated blood pressures    PLAN:  Okay to discontinue labetalol.  Spot check blood pressures at home and call if systolic readings are greater than 145.  The patient stated she is probably not getting enough water intake.  She is also pumping/breast-feeding which is depleting her vasculature as well.  She was  encouraged to increase her water intake.  Follow-up in 3 months        CHF Guideline Directed Medical Therapy  Beta Blocker:   ARNI/ACE/ARB:   SGLT 2 inhibitors:   Diuretics:   Aldosterone Antagonist:   Vasodilators & Nitrates:       Diagnoses and all orders for this visit:    1. Pre-eclampsia in third trimester (Primary)    2. Cardiomyopathy, hypertensive, without heart failure    3. Elevated blood pressure reading    Other orders  -     ECG 12 Lead          The following portions of the patient's history were reviewed and updated as appropriate: allergies, current medications, past family history, past medical history, past social history, past surgical history, and problem list.    REVIEW OF SYSTEMS:    Review of Systems   All other systems reviewed and are negative.      Vitals:    24 1058   BP: 111/74   Pulse: 76   SpO2: 98%         PHYSICAL EXAM:    General: Alert, cooperative, no distress, appears stated age  Head:  Normocephalic, atraumatic, mucous membranes moist  Eyes:  Conjunctiva/corneas clear, EOM's intact     Neck:  Supple,  no JVD or bruit     Lungs: Clear to auscultation bilaterally, no wheezes rhonchi rales are noted  Chest wall: No tenderness  Musculoskeletal:   Ambulates freely without assistance  Heart::  Regular rate and rhythm, S1 and S2 normal, no murmur, rub or gallop  Abdomen: Soft, non-tender, nondistended, bowel sounds active, no abdominal bruit  Extremities: No cyanosis, clubbing, or edema   Pulses: 2+ and symmetric all extremities  Skin:  No rashes or lesions  Neuro/psych: A&O x3. CN II through XII are grossly intact with appropriate affect        History reviewed. No pertinent past medical history.    Past Surgical History:   Procedure Laterality Date     SECTION N/A 10/26/2024    Procedure:  SECTION PRIMARY;  Surgeon: Ashli Frances MD;  Location: [facility] LABOR DELIVERY;  Service: Obstetrics;  Laterality: N/A;    D & C WITH SUCTION N/A 2024    Procedure:  "DILATATION AND CURETTAGE WITH SUCTION;  Surgeon: Jessenia Le MD;  Location: [facility] MAIN OR;  Service: Obstetrics/Gynecology;  Laterality: N/A;    MOUTH SURGERY      TONSILLECTOMY           Current Outpatient Medications:     Ascorbic Acid crystals, Take  by mouth., Disp: , Rfl:     aspirin 81 MG EC tablet, Take 1 tablet by mouth Daily., Disp: , Rfl:     labetalol (NORMODYNE) 200 MG tablet, Take 2 tablets by mouth 2 (Two) Times a Day., Disp: 120 tablet, Rfl: 2    PRENATAL VIT-FE FUMARATE-FA PO, Take  by mouth., Disp: , Rfl:     VITAMIN D PO, Take  by mouth., Disp: , Rfl:     No Known Allergies    Family History   Problem Relation Age of Onset    Heart disease Maternal Grandfather        Social History     Tobacco Use    Smoking status: Never     Passive exposure: Never    Smokeless tobacco: Never   Substance Use Topics    Alcohol use: Never           Current Electrocardiogram:    ECG 12 Lead    Date/Time: 12/2/2024 12:51 PM  Performed by: Briseyda Trevizo APRN    Authorized by: Briseyda Trevizo APRN  Comparison: not compared with previous ECG   Rhythm: sinus rhythm  BPM: 76              EMR Dragon/Transcription:   \"Dictated utilizing Dragon dictation\".     Copied text in this note has been reviewed by me and is accurate as of 12/02/24.    "

## 2024-12-19 ENCOUNTER — TELEPHONE (OUTPATIENT)
Dept: LACTATION | Facility: HOSPITAL | Age: 24
End: 2024-12-19
Payer: COMMERCIAL

## 2024-12-19 NOTE — TELEPHONE ENCOUNTER
Pt called, concerned when she delays pumping sometimes que drains some small clomps of milk, no other signs or symptoms noted, advised as normal possible accumulation of milk fat particles. Continue pumping as normal , she is exclusively pumping & feeding. Will call for help as needed.

## 2025-03-24 ENCOUNTER — OFFICE VISIT (OUTPATIENT)
Dept: CARDIOLOGY | Facility: CLINIC | Age: 25
End: 2025-03-24
Payer: COMMERCIAL

## 2025-03-24 VITALS
BODY MASS INDEX: 42.13 KG/M2 | DIASTOLIC BLOOD PRESSURE: 75 MMHG | HEART RATE: 81 BPM | OXYGEN SATURATION: 97 % | WEIGHT: 278 LBS | HEIGHT: 68 IN | SYSTOLIC BLOOD PRESSURE: 115 MMHG

## 2025-03-24 DIAGNOSIS — R03.0 ELEVATED BLOOD PRESSURE READING: ICD-10-CM

## 2025-03-24 DIAGNOSIS — O14.93 PRE-ECLAMPSIA IN THIRD TRIMESTER: Primary | ICD-10-CM

## 2025-03-24 PROCEDURE — 99213 OFFICE O/P EST LOW 20 MIN: CPT | Performed by: NURSE PRACTITIONER

## 2025-03-24 RX ORDER — ZINC SULFATE 50(220)MG
220 CAPSULE ORAL DAILY
COMMUNITY

## 2025-03-24 NOTE — PROGRESS NOTES
[facility] CARDIOLOGY      REASON FOR FOLLOW-UP:  Preeclampsia with elevated blood pressures          Chief Complaint   Patient presents with    Heart Problem         Dear Anne-Marie Ibarra APRN        Heart Problem     It was my pleasure to see Lynn Soto in the office today. She is a 24-year-old female with no significant past cardiac history. 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 The patient was evaluated at [facility] 10/30/2024 6 days postpartum  section with lower extremity edema and elevated blood pressure. She had been started on labetalol prepartum with dose frequency increased to 200 mg 3 times daily. Cardiology evaluated the patient. TTE performed showed normal LV systolic function with EF 61-65%, mild concentric LVH, left atrial cavity was moderate to severely dilated. She was discharged home on labetalol dosing as above.  In follow-up visit 24 she reported feeling very well.  She had noted some intermittent dizziness throughout the day.  Her blood pressure in the office was 111/74.  Labetalol was discontinued and she was asked to spot check her blood pressures.  She presents today in follow-up for the above diagnoses.    Today, Lynn reports that she continues to feel very well.  She has been off labetalol since last office visit and her blood pressures have been excellent at home.  In the office today is 115/75.  She denies any lower extremity edema.  No further dizziness.  She has had no chest discomfort.      ASSESSMENT:  Preeclampsia-resolved   delivery of female infant  Lower extremity edema-resolved  Elevated blood pressures-resolved    PLAN:  I will see the patient as needed.  She is to call with any questions or concerns.        CHF Guideline Directed Medical Therapy  Beta Blocker:   ARNI/ACE/ARB:   SGLT 2 inhibitors:   Diuretics:   Aldosterone Antagonist:   Vasodilators & Nitrates:        Diagnoses and all orders for this visit:    1. Pre-eclampsia in third trimester (Primary)    2. Elevated blood pressure reading          The following portions of the patient's history were reviewed and updated as appropriate: allergies, current medications, past family history, past medical history, past social history, past surgical history, and problem list.    REVIEW OF SYSTEMS:    Review of Systems   All other systems reviewed and are negative.      Vitals:    25 1452   BP: 115/75   Pulse: 81   SpO2: 97%         PHYSICAL EXAM:    General: Alert, cooperative, no distress, appears stated age  Head:  Normocephalic, atraumatic, mucous membranes moist  Eyes:  Conjunctiva/corneas clear, EOM's intact     Neck:  Supple,  no JVD or bruit     Lungs: Clear to auscultation bilaterally, no wheezes rhonchi rales are noted  Chest wall: No tenderness  Musculoskeletal:   Ambulates freely without assistance  Heart::  Regular rate and rhythm, S1 and S2 normal, no murmur, rub or gallop  Abdomen: Soft, non-tender, nondistended, bowel sounds active, no abdominal bruit  Extremities: No cyanosis, clubbing, or edema   Pulses: 2+ and symmetric all extremities  Skin:  No rashes or lesions  Neuro/psych: A&O x3. CN II through XII are grossly intact with appropriate affect        History reviewed. No pertinent past medical history.    Past Surgical History:   Procedure Laterality Date     SECTION N/A 10/26/2024    Procedure:  SECTION PRIMARY;  Surgeon: Ashli Frances MD;  Location: [facility] LABOR DELIVERY;  Service: Obstetrics;  Laterality: N/A;    D & C WITH SUCTION N/A 2024    Procedure: DILATATION AND CURETTAGE WITH SUCTION;  Surgeon: Jessenia Le MD;  Location: [facility] MAIN OR;  Service: Obstetrics/Gynecology;  Laterality: N/A;    MOUTH SURGERY      TONSILLECTOMY           Current Outpatient Medications:     PRENATAL VIT-FE FUMARATE-FA PO, Take  by mouth., Disp: , Rfl:     VITAMIN D PO, Take  by  "mouth., Disp: , Rfl:     zinc sulfate (ZINCATE) 220 (50 Zn) MG capsule, Take 1 capsule by mouth Daily., Disp: , Rfl:     No Known Allergies    Family History   Problem Relation Age of Onset    Heart disease Maternal Grandfather        Social History     Tobacco Use    Smoking status: Never     Passive exposure: Never    Smokeless tobacco: Never   Substance Use Topics    Alcohol use: Never           Current Electrocardiogram:  Procedures        EMR Dragon/Transcription:   \"Dictated utilizing Dragon dictation\".     Copied text in this note has been reviewed by me and is accurate as of 03/24/25.    "

## (undated) DEVICE — SUT MNCRYL 0 CT 36IN

## (undated) DEVICE — PK MINOR LITHOTOMY 50

## (undated) DEVICE — GLV SURG SIGNATURE ESSENTIAL PF LTX SZ6.5

## (undated) DEVICE — TBG W FLTR FOR BERKELEY SYSTEM

## (undated) DEVICE — SOL IRRIG H2O 1000ML STRL

## (undated) DEVICE — KT SURG TURNOVER 050

## (undated) DEVICE — SUT MNCRYL 0/0 CTX 36IN Y398H

## (undated) DEVICE — SUT VIC 0 CT1 36IN J946H

## (undated) DEVICE — DRSNG WND BORDR/ADHS NONADHR/GZ LF 4X10IN STRL

## (undated) DEVICE — GLV SURG SENSICARE PI MIC PF SZ7 LF STRL

## (undated) DEVICE — SOLUTION,WATER,IRRIGATION,1000ML,STERILE: Brand: MEDLINE

## (undated) DEVICE — ST COL BERKELY TBG

## (undated) DEVICE — VACURETTE CRV RIGD 8MM DISP

## (undated) DEVICE — PK C SECT 50

## (undated) DEVICE — GOWN,REINF,POLY,ECL,PP SLV,XXL: Brand: MEDLINE

## (undated) DEVICE — WET SKIN PREP TRAY: Brand: MEDLINE INDUSTRIES, INC.

## (undated) DEVICE — TRY SADDLE BLCK 25G

## (undated) DEVICE — SUT MNCRYL 3/0 CT1 36 IN Y944H

## (undated) DEVICE — TRAP TISS

## (undated) DEVICE — SPNG LAP PREWSH SFTPK 18X18IN STRL PK/5

## (undated) DEVICE — CVR HNDL LT SURG ACCSSRY BLU STRL

## (undated) DEVICE — HOSE BT TO BT VAC CURETTAGE SNGL PT USE EA/10

## (undated) DEVICE — SYS FLUID MGMT HYST SAFETOUCH

## (undated) DEVICE — GOWN IMPERV OPN/BK KNT/CUF XXL

## (undated) DEVICE — SOL IRRIG NACL 9PCT 1000ML BTL